# Patient Record
Sex: MALE | Race: WHITE
[De-identification: names, ages, dates, MRNs, and addresses within clinical notes are randomized per-mention and may not be internally consistent; named-entity substitution may affect disease eponyms.]

---

## 2019-02-13 ENCOUNTER — HOSPITAL ENCOUNTER (OUTPATIENT)
Dept: HOSPITAL 33 - SDC-PAIN | Age: 79
Discharge: HOME | End: 2019-02-13
Attending: PSYCHIATRY & NEUROLOGY
Payer: MEDICARE

## 2019-02-13 DIAGNOSIS — E11.9: ICD-10-CM

## 2019-02-13 DIAGNOSIS — Z79.899: ICD-10-CM

## 2019-02-13 DIAGNOSIS — M54.5: ICD-10-CM

## 2019-02-13 DIAGNOSIS — M47.816: Primary | ICD-10-CM

## 2019-02-13 DIAGNOSIS — I10: ICD-10-CM

## 2019-02-13 PROCEDURE — 72020 X-RAY EXAM OF SPINE 1 VIEW: CPT

## 2019-02-13 PROCEDURE — 77002 NEEDLE LOCALIZATION BY XRAY: CPT

## 2019-02-13 NOTE — XRAY
Indication: L3-S1 MBB.



Intraoperative fluoroscopy was provided for 15 seconds.  2 digital spot images

submitted for interpretation demonstrates posterior spinal needle tips

projecting over the expected course of the left and right L3-S1 nerve roots.

Correlate with intraoperative findings/report.

## 2019-03-13 ENCOUNTER — HOSPITAL ENCOUNTER (OUTPATIENT)
Dept: HOSPITAL 33 - SDC-PAIN | Age: 79
Discharge: HOME | End: 2019-03-13
Attending: PSYCHIATRY & NEUROLOGY
Payer: MEDICARE

## 2019-03-13 DIAGNOSIS — M19.90: ICD-10-CM

## 2019-03-13 DIAGNOSIS — M47.817: Primary | ICD-10-CM

## 2019-03-13 DIAGNOSIS — E11.9: ICD-10-CM

## 2019-03-13 DIAGNOSIS — I25.10: ICD-10-CM

## 2019-03-13 DIAGNOSIS — I10: ICD-10-CM

## 2019-03-13 PROCEDURE — 77002 NEEDLE LOCALIZATION BY XRAY: CPT

## 2019-03-13 PROCEDURE — 72100 X-RAY EXAM L-S SPINE 2/3 VWS: CPT

## 2019-03-13 PROCEDURE — 83721 ASSAY OF BLOOD LIPOPROTEIN: CPT

## 2019-03-13 PROCEDURE — 99100 ANES PT EXTEME AGE<1 YR&>70: CPT

## 2019-03-13 PROCEDURE — 80048 BASIC METABOLIC PNL TOTAL CA: CPT

## 2019-03-13 PROCEDURE — 36415 COLL VENOUS BLD VENIPUNCTURE: CPT

## 2019-03-13 PROCEDURE — 80061 LIPID PANEL: CPT

## 2019-03-13 NOTE — XRAY
Indication: Bilateral L3-S1 MBB.



Intraoperative fluoroscopy was provided for 15 seconds.  Single digital spot

image submitted for interpretation demonstrates posterior spinal needle tips

projecting over the expected course of the left and right L3-S1 nerve roots.

Correlate with intraoperative findings/report.

## 2019-04-03 ENCOUNTER — HOSPITAL ENCOUNTER (OUTPATIENT)
Dept: HOSPITAL 33 - SDC-PAIN | Age: 79
Discharge: HOME | End: 2019-04-03
Attending: PSYCHIATRY & NEUROLOGY
Payer: MEDICARE

## 2019-04-03 DIAGNOSIS — I25.10: ICD-10-CM

## 2019-04-03 DIAGNOSIS — I10: ICD-10-CM

## 2019-04-03 DIAGNOSIS — E11.9: ICD-10-CM

## 2019-04-03 DIAGNOSIS — M19.90: ICD-10-CM

## 2019-04-03 DIAGNOSIS — M47.817: Primary | ICD-10-CM

## 2019-04-03 DIAGNOSIS — Z79.899: ICD-10-CM

## 2019-04-03 PROCEDURE — 64633 DESTROY CERV/THOR FACET JNT: CPT

## 2019-04-03 PROCEDURE — 99100 ANES PT EXTEME AGE<1 YR&>70: CPT

## 2019-04-03 PROCEDURE — 77002 NEEDLE LOCALIZATION BY XRAY: CPT

## 2019-04-03 PROCEDURE — 64636 DESTROY L/S FACET JNT ADDL: CPT

## 2019-04-03 PROCEDURE — 64635 DESTROY LUMB/SAC FACET JNT: CPT

## 2019-04-03 PROCEDURE — 82962 GLUCOSE BLOOD TEST: CPT

## 2019-04-03 PROCEDURE — 72020 X-RAY EXAM OF SPINE 1 VIEW: CPT

## 2019-04-03 NOTE — XRAY
Indication: Left L3-S1 RFA.



Intraoperative fluoroscopy was provided for 59 seconds.  3 digital spot images

submitted for interpretation demonstrates posterior needle tips along the

expected course of the left L3-S1 nerve roots.  Correlate with intraoperative

findings/report.

## 2019-04-17 ENCOUNTER — HOSPITAL ENCOUNTER (OUTPATIENT)
Dept: HOSPITAL 33 - SDC-PAIN | Age: 79
Discharge: HOME | End: 2019-04-17
Attending: PSYCHIATRY & NEUROLOGY
Payer: MEDICARE

## 2019-04-17 DIAGNOSIS — M47.816: Primary | ICD-10-CM

## 2019-04-17 DIAGNOSIS — I25.10: ICD-10-CM

## 2019-04-17 DIAGNOSIS — E11.9: ICD-10-CM

## 2019-04-17 DIAGNOSIS — Z95.0: ICD-10-CM

## 2019-04-17 DIAGNOSIS — M19.90: ICD-10-CM

## 2019-04-17 DIAGNOSIS — Z86.73: ICD-10-CM

## 2019-04-17 DIAGNOSIS — I10: ICD-10-CM

## 2019-04-17 PROCEDURE — 99100 ANES PT EXTEME AGE<1 YR&>70: CPT

## 2019-04-17 PROCEDURE — 64636 DESTROY L/S FACET JNT ADDL: CPT

## 2019-04-17 PROCEDURE — 72020 X-RAY EXAM OF SPINE 1 VIEW: CPT

## 2019-04-17 PROCEDURE — 82962 GLUCOSE BLOOD TEST: CPT

## 2019-04-17 PROCEDURE — 77002 NEEDLE LOCALIZATION BY XRAY: CPT

## 2019-04-17 PROCEDURE — 64635 DESTROY LUMB/SAC FACET JNT: CPT

## 2019-04-17 NOTE — XRAY
Indication: Right L3-S1 RFA.



Intraoperative fluoroscopy was provided for 36 seconds.  4 digital spot images

submitted for interpretation demonstrates posterior needle tips along the

expected course of the right L3-S1 nerve roots.  Correlate with intraoperative

findings/report.

## 2019-06-20 ENCOUNTER — HOSPITAL ENCOUNTER (OUTPATIENT)
Dept: HOSPITAL 33 - SDC | Age: 79
Discharge: HOME | End: 2019-06-20
Attending: SURGERY
Payer: MEDICARE

## 2019-06-20 VITALS — SYSTOLIC BLOOD PRESSURE: 134 MMHG | HEART RATE: 70 BPM | DIASTOLIC BLOOD PRESSURE: 65 MMHG

## 2019-06-20 VITALS — OXYGEN SATURATION: 95 %

## 2019-06-20 DIAGNOSIS — D12.0: ICD-10-CM

## 2019-06-20 DIAGNOSIS — E11.9: ICD-10-CM

## 2019-06-20 DIAGNOSIS — K64.8: ICD-10-CM

## 2019-06-20 DIAGNOSIS — K62.5: Primary | ICD-10-CM

## 2019-06-20 PROCEDURE — 82962 GLUCOSE BLOOD TEST: CPT

## 2019-06-20 PROCEDURE — 99100 ANES PT EXTEME AGE<1 YR&>70: CPT

## 2019-06-20 NOTE — HP
DATE OF SURGERY:  06/20/2019



ANTICIPATED PROCEDURE:  Colonoscopy.



HISTORY OF PRESENT ILLNESS:  The patient had rectal bleeding and presents for endoscopic 
exam referred by Dr. Cailin Lea.

 

PAST MEDICAL HISTORY: 

ALLERGIES:  NONE.

MEDICATIONS:  See list. 



PAST SURGICAL HISTORY:  Shoulder surgery. Bypass. 



SOCIAL HISTORY: Negative.  

FAMILY HISTORY: Negative.



REVIEW OF SYSTEMS: Negative.



PHYSICAL EXAMINATION:  

VITAL SIGNS: Normal.

CHEST:  Clear.

COR: Regular.

ABDOMEN:  Satisfactory.  



IMPRESSION:  Rectal bleeding. 



PLAN:  Colonoscopy.

## 2019-06-20 NOTE — OP
SURGERY DATE/TIME:      06/20/2019  0848



PREOPERATIVE DIAGNOSIS:     Rectal bleeding.



POSTOPERATIVE DIAGNOSIS:   Moderate internal hemorrhoids, one cecal polyp. 



PROCEDURE:  Colonoscopy complete to cecum. There was a substantial amount of angulation 
and redundancy of the hepatic flexure but the cecum was obtained. Prep was marginal.



SURGEON:        Kam Sharma M.D.



ASSISTANT:         Yo Louie M.D.



ANESTHESIA:     MAC. 



COMPLICATIONS:    None.



CONDITION:        Stable.



FOLLOW UP:  Three years.



INDICATION:  The patient presents with rectal bleeding. 



DESCRIPTION OF PROCEDURE:  Taken to endoscopy. Left lateral decubitus position. He is a 
robust gentleman. Anal tone was satisfactory. Scope introduced. There were moderate 
internal hemorrhoids. The scope advanced to the hepatic flexure and it took some care, 
patience and abdominal pressure to advance this over into the cecum which was about 
another 14 inches down. Base of cecum was identified. There was a 1 cm cecal polyp that 
was taken with cold biopsy forceps to extinction. On circumferential withdraw, no 
additional mucosal lesions although prep was marginal. There were moderate internal 
hemorrhoids. The patient tolerated the procedure satisfactorily. Recommended follow up in 
three years.

## 2020-03-27 ENCOUNTER — HOSPITAL ENCOUNTER (OUTPATIENT)
Dept: HOSPITAL 33 - ED | Age: 80
Setting detail: OBSERVATION
LOS: 1 days | Discharge: HOME | End: 2020-03-28
Attending: GENERAL PRACTICE | Admitting: GENERAL PRACTICE
Payer: MEDICARE

## 2020-03-27 VITALS — HEART RATE: 60 BPM

## 2020-03-27 DIAGNOSIS — Z95.1: ICD-10-CM

## 2020-03-27 DIAGNOSIS — Z91.81: ICD-10-CM

## 2020-03-27 DIAGNOSIS — Z95.0: ICD-10-CM

## 2020-03-27 DIAGNOSIS — Z95.2: ICD-10-CM

## 2020-03-27 DIAGNOSIS — I73.9: ICD-10-CM

## 2020-03-27 DIAGNOSIS — E78.00: ICD-10-CM

## 2020-03-27 DIAGNOSIS — R42: ICD-10-CM

## 2020-03-27 DIAGNOSIS — E11.9: ICD-10-CM

## 2020-03-27 DIAGNOSIS — R53.1: Primary | ICD-10-CM

## 2020-03-27 DIAGNOSIS — Z79.899: ICD-10-CM

## 2020-03-27 DIAGNOSIS — S81.002A: ICD-10-CM

## 2020-03-27 DIAGNOSIS — I95.1: ICD-10-CM

## 2020-03-27 DIAGNOSIS — Z79.01: ICD-10-CM

## 2020-03-27 DIAGNOSIS — R07.9: ICD-10-CM

## 2020-03-27 DIAGNOSIS — I10: ICD-10-CM

## 2020-03-27 LAB
ALBUMIN SERPL-MCNC: 3.7 G/DL (ref 3.5–5)
ALP SERPL-CCNC: 66 U/L (ref 38–126)
ALT SERPL-CCNC: 12 U/L (ref 0–50)
ANION GAP SERPL CALC-SCNC: 10.2 MEQ/L (ref 5–15)
ANION GAP SERPL CALC-SCNC: 11.2 MEQ/L (ref 5–15)
AST SERPL QL: 24 U/L (ref 17–59)
BASOPHILS # BLD AUTO: 0.05 10*3/UL (ref 0–0.4)
BASOPHILS NFR BLD AUTO: 0.6 % (ref 0–0.4)
BILIRUB BLD-MCNC: 0.4 MG/DL (ref 0.2–1.3)
BUN SERPL-MCNC: 34 MG/DL (ref 9–20)
BUN SERPL-MCNC: 36 MG/DL (ref 9–20)
CALCIUM SPEC-MCNC: 8.6 MG/DL (ref 8.4–10.2)
CALCIUM SPEC-MCNC: 8.9 MG/DL (ref 8.4–10.2)
CHLORIDE SERPL-SCNC: 107 MMOL/L (ref 98–107)
CHLORIDE SERPL-SCNC: 108 MMOL/L (ref 98–107)
CO2 SERPL-SCNC: 26 MMOL/L (ref 22–30)
CO2 SERPL-SCNC: 27 MMOL/L (ref 22–30)
CREAT SERPL-MCNC: 1.21 MG/DL (ref 0.66–1.25)
CREAT SERPL-MCNC: 1.3 MG/DL (ref 0.66–1.25)
EOSINOPHIL # BLD AUTO: 0.06 10*3/UL (ref 0–0.5)
GLUCOSE SERPL-MCNC: 69 MG/DL (ref 74–106)
GLUCOSE SERPL-MCNC: 93 MG/DL (ref 74–106)
GLUCOSE UR-MCNC: NEGATIVE MG/DL
HCT VFR BLD AUTO: 34.9 % (ref 42–50)
HGB BLD-MCNC: 11.4 GM/DL (ref 12.5–18)
LYMPHOCYTES # SPEC AUTO: 1.65 10*3/UL (ref 1–4.6)
MCH RBC QN AUTO: 30.2 PG (ref 26–32)
MCHC RBC AUTO-ENTMCNC: 32.7 G/DL (ref 32–36)
MONOCYTES # BLD AUTO: 0.66 10*3/UL (ref 0–1.3)
PLATELET # BLD AUTO: 218 K/MM3 (ref 150–450)
POTASSIUM SERPLBLD-SCNC: 3.8 MMOL/L (ref 3.5–5.1)
POTASSIUM SERPLBLD-SCNC: 4.1 MMOL/L (ref 3.5–5.1)
PROT SERPL-MCNC: 7.2 G/DL (ref 6.3–8.2)
PROT UR STRIP-MCNC: NEGATIVE MG/DL
RBC # BLD AUTO: 3.77 M/MM3 (ref 4.1–5.6)
RBC #/AREA URNS HPF: (no result) /HPF (ref 0–2)
SODIUM SERPL-SCNC: 140 MMOL/L (ref 137–145)
SODIUM SERPL-SCNC: 141 MMOL/L (ref 137–145)
WBC # BLD AUTO: 8.4 K/MM3 (ref 4–10.5)
WBC #/AREA URNS HPF: (no result) /HPF (ref 0–5)

## 2020-03-27 PROCEDURE — 87040 BLOOD CULTURE FOR BACTERIA: CPT

## 2020-03-27 PROCEDURE — 84484 ASSAY OF TROPONIN QUANT: CPT

## 2020-03-27 PROCEDURE — 83605 ASSAY OF LACTIC ACID: CPT

## 2020-03-27 PROCEDURE — 93005 ELECTROCARDIOGRAM TRACING: CPT

## 2020-03-27 PROCEDURE — 85025 COMPLETE CBC W/AUTO DIFF WBC: CPT

## 2020-03-27 PROCEDURE — 82962 GLUCOSE BLOOD TEST: CPT

## 2020-03-27 PROCEDURE — G0378 HOSPITAL OBSERVATION PER HR: HCPCS

## 2020-03-27 PROCEDURE — 71045 X-RAY EXAM CHEST 1 VIEW: CPT

## 2020-03-27 PROCEDURE — 96360 HYDRATION IV INFUSION INIT: CPT

## 2020-03-27 PROCEDURE — 93268 ECG RECORD/REVIEW: CPT

## 2020-03-27 PROCEDURE — 36415 COLL VENOUS BLD VENIPUNCTURE: CPT

## 2020-03-27 PROCEDURE — 80048 BASIC METABOLIC PNL TOTAL CA: CPT

## 2020-03-27 PROCEDURE — 72220 X-RAY EXAM SACRUM TAILBONE: CPT

## 2020-03-27 PROCEDURE — 81001 URINALYSIS AUTO W/SCOPE: CPT

## 2020-03-27 PROCEDURE — 70450 CT HEAD/BRAIN W/O DYE: CPT

## 2020-03-27 PROCEDURE — 83880 ASSAY OF NATRIURETIC PEPTIDE: CPT

## 2020-03-27 PROCEDURE — 93041 RHYTHM ECG TRACING: CPT

## 2020-03-27 PROCEDURE — 96361 HYDRATE IV INFUSION ADD-ON: CPT

## 2020-03-27 PROCEDURE — 80053 COMPREHEN METABOLIC PANEL: CPT

## 2020-03-27 PROCEDURE — 72110 X-RAY EXAM L-2 SPINE 4/>VWS: CPT

## 2020-03-27 PROCEDURE — 99285 EMERGENCY DEPT VISIT HI MDM: CPT

## 2020-03-27 PROCEDURE — 36000 PLACE NEEDLE IN VEIN: CPT

## 2020-03-27 RX ADMIN — ACETAMINOPHEN PRN MG: 325 TABLET ORAL at 12:40

## 2020-03-27 RX ADMIN — SACUBITRIL AND VALSARTAN SCH TABLET: 49; 51 TABLET, FILM COATED ORAL at 20:53

## 2020-03-27 RX ADMIN — ACETAMINOPHEN PRN MG: 325 TABLET ORAL at 20:53

## 2020-03-27 NOTE — XRAY
Indication: Pain and dizziness.  Status post fall.



Comparison: June 5, 2019.



Portable chest remains clear again with a few incidental calcified granulomas.

 Heart is borderline enlarged again with CABG surgery and left pacemaker.

Bony thorax intact.  No new/acute findings.



Impression: Continued nonacute chest with chronic features.

## 2020-03-27 NOTE — ERPHSYRPT
- History of Present Illness


Time Seen by Provider: 03/27/20 07:33


Source: patient


Exam Limitations: no limitations


Patient Subjective Stated Complaint: lower back and L hip pain d/t recent falls


Triage Nursing Assessment: pt to ED c/o recent falls x few days and now having 

lower back and L hip pain. unable to give pain score at this time, "it just 

hurts." pt back to room via WC. transfers to bed with 1 assist. pt states 

multiple episodes dizziness and falls over last few days. pt states he is on 

plavix and zorelto. denies hitting head, no LOC. pt A&Ox3 at this time. 

communicates appropriately. lung sounds clear and equal bilaterally. heart 

sounds clear. bowel sounds present in all quads. pt has pacemaker and 

artificial heart valve.


Physician History: 


79 years old male with history of coronary artery disease status post CABG, 

pacemaker placement, CVA, diabetes mellitus, hypertension, hyperlipidemia 

presented in the ER with chief complaint of frequent fall and generalized 

weakness for the last 3 to 4 days.  Patient reports having multiple falls 

whenever he tries to get up and walk and hit his head couple of times.  No loss 

of consciousness.  Patient reports he feels dizzy and lightheaded but denies 

any chest pain palpitations or shortness of breath.  He denies any focal 

weakness but all over.  Denies any recent nausea vomiting or diarrhea.  No 

abdominal pain.  No recent fever or chills reported.  He is complaining of pain 

in the tailbone area mild to moderate especially with ambulation and palpation.





Timing/Duration: day(s) (4), intermittent, worse


Severity: moderate


Modifying Factors: Improves With: movement


Associated Symptoms: weakness (generalized)


Allergies/Adverse Reactions: 








No Known Drug Allergies Allergy (Verified 03/27/20 07:34)


 





Home Medications: 








Clopidogrel Bisulfate 75 mg*** [PLAVIX 75 MG Tablet***] 75 mg PO DAILY 06/12/19 

[History]


Docusate Sodium 100 mg*** [Colace 100 MG***] 100 mg PO DAILY 06/12/19 [History]


Furosemide 40 mg*** [Lasix 40 MG***] 40 mg PO DAILY 06/12/19 [History]


Isosorbide Mononitrate 30 mg** [Imdur 30 MG***] 30 mg PO DAILY 06/12/19 [History

]


Magnesium Oxide 400 mg*** [Mag-Ox 400***] 400 mg PO DAILY 06/12/19 [History]


Metoprolol Succinate 50 mg*** [Toprol Xl 50 MG***] 50 mg PO DAILY 06/12/19 [

History]


Pravastatin Sodium [Pravachol] 40 mg PO DAILY 06/12/19 [History]


Rivaroxaban [Xarelto] 15 mg PO DAILY 06/12/19 [History]


Sacubitril/Valsartan [Entresto 49 mg-51 mg Tablet] 1 each PO BID 06/12/19 [

History]


Insulin Glargine,Hum.rec.anlog [Lantus] 100 unit SQ DAILY 06/20/19 [History]


Insulin Lispro [Humalog] 100 unit SQ DAILY PRN 06/20/19 [History]





Hx Tetanus, Diphtheria Vaccination/Date Given: No


Hx Influenza Vaccination/Date Given: Yes


Hx Pneumococcal Vaccination/Date Given: Yes


Immunizations Up to Date: No





Travel Risk





- International Travel


Have you traveled outside of the country in past 3 weeks: No


Have you or anyone close to you been diagnosed with or: No


Do your reside in a community with a known COVID-19 case?: Yes


If Yes where:: [place]





- Coronavirus Screening


Has patient experienced Coronavirus symptoms: No





- Review of Systems


Constitutional: Fatigue


Eyes: No Symptoms


Ears, Nose, & Throat: No Symptoms


Respiratory: No Symptoms


Cardiac: No Symptoms


Abdominal/Gastrointestinal: No Symptoms


Genitourinary Symptoms: No Symptoms


Musculoskeletal: Fall, Injury


Skin: No Symptoms


Neurological: No Symptoms


Psychological: No Symptoms


Endocrine: No Symptoms


Hematologic/Lymphatic: No Symptoms


Immunological/Allergic: No Symptoms





- Past Medical History


Pertinent Past Medical History: Yes


Neurological History: Stroke


ENT History: Cataracts


Cardiac History: Congestive Heart Failure, Coronary Artery Disease, High 

Cholesterol, Hypertension


Respiratory History: Sleep Apnea


Endocrine Medical History: Diabetes Type II


Musculoskeletal History: Arthritis


GI Medical History: No Pertinent History


 History: No Pertinent History


Psycho-Social History: No Pertinent History


Male Reproductive Disorders: No Pertinent History





- Past Surgical History


Past Surgical History: Yes


Neuro Surgical History: No Pertinent History


Cardiac: CABG, Pacemaker


Respiratory: No Pertinent History


Gastrointestinal: No Pertinent History


Genitourinary: No Pertinent History


Musculoskeletal: Other


Male Surgical History: No Pertinent History


Other Surgical History: right knee,  right shoulder surgery.cancer removed on 

lip and colonscopy in past





- Social History


Smoking Status: Former smoker


Exposure to second hand smoke: No


Drug Use: none





- Nursing Vital Signs


Nursing Vital Signs: 


 Initial Vital Signs











Temperature  97.8 F   03/27/20 07:17


 


Pulse Rate  60   03/27/20 07:17


 


Respiratory Rate  16   03/27/20 07:17


 


Blood Pressure  141/58   03/27/20 07:17


 


O2 Sat by Pulse Oximetry  96   03/27/20 07:17














- Physical Exam


General Appearance: no apparent distress, alert


Eye Exam: PERRL/EOMI, eyes nml inspection


Ears, Nose, Throat Exam: normal ENT inspection, TMs normal, pharynx normal


Neck Exam: normal inspection, non-tender, supple, full range of motion


Respiratory Exam: normal breath sounds, lungs clear, airway intact, No 

respiratory distress


Cardiovascular Exam: regular rate/rhythm, normal heart sounds, normal 

peripheral pulses


Gastrointestinal/Abdomen Exam: soft, normal bowel sounds, No tenderness, No 

distention


Rectal Exam: deferred


Back Exam: decreased range of motion, point tenderness (Tailbone area), other, 

No CVA tenderness, No vertebral tenderness


Extremity Exam: normal inspection, normal range of motion, pelvis stable


Neurologic Exam: alert, oriented x 3, cooperative, CNs II-XII nml as tested, 

normal mood/affect


Skin Exam: normal color


**SpO2 Interpretation**: normal


SpO2: 96


O2 Delivery: Room Air





- Course


Nursing assessment & vital signs reviewed: Yes


EKG Interpreted by Me: RATE (60), Right Axis Deviation (Rhythm.  Wide QRS 

complexes.  No acute ST elevation or depression)


Ordered Tests: 


 Active Orders 24 hr











 Category Date Time Status


 


 Cardiac Monitor STAT Care  03/27/20 07:50 Active


 


 EKG-ER Only STAT Care  03/27/20 07:48 Active


 


 IV Insertion STAT Care  03/27/20 07:48 Active


 


 Orthostatic Vital Signs STAT Care  03/27/20 07:48 Active


 


 2000 Calorie ADA Diet  03/27/20 Breakfast Active


 


 CHEST 1 VIEW (PORTABLE) Stat Exams  03/27/20 07:49 Completed


 


 HEAD WITHOUT CONTRAST [CT] Stat Exams  03/27/20 07:49 Completed


 


 LUMBAR COMPLETE (MIN 4 VIEWS) Stat Exams  03/27/20 08:13 Completed


 


 SACRUM AND COCCYX Stat Exams  03/27/20 08:15 Completed


 


 BNP [NT PRO BNP] Stat Lab  03/27/20 07:40 Completed


 


 CBC W DIFF Stat Lab  03/27/20 07:40 Completed


 


 CMP Stat Lab  03/27/20 07:40 Completed


 


 Lactic Acid Stat Lab  03/27/20 08:36 Completed


 


 TROPONIN Q3H Lab  03/27/20 07:40 Completed


 


 TROPONIN Q3H Lab  03/27/20 11:00 Ordered


 


 TROPONIN Q3H Lab  03/27/20 14:00 Ordered


 


 TROPONIN Q3H Lab  03/27/20 17:00 Ordered


 


 TROPONIN Q3H Lab  03/27/20 20:00 Ordered


 


 UA W/RFX UR CULTURE Stat Lab  03/27/20 07:49 Completed








Medication Summary














Discontinued Medications














Generic Name Dose Route Start Last Admin





  Trade Name Freq  PRN Reason Stop Dose Admin


 


Sodium Chloride  1,000 mls @ 499 mls/hr  03/27/20 07:52  03/27/20 10:56





  Sodium Chloride 0.9% 1000 Ml  IV  03/27/20 09:52  Infused





  .Q2H1M STA   Infusion





     





     





     





     


 


Sodium Chloride  Confirm  03/27/20 07:57  





  Sodium Chloride 0.9% 1000 Ml  Administered  03/27/20 07:58  





  Dose   





  1,000 mls @ ud   





  .ROUTE   





  .STK-MED ONE   





     





     





     





     











Lab/Rad Data: 


 Laboratory Result Diagrams





 03/27/20 07:40 





 03/27/20 07:40 





 Laboratory Results











  03/27/20 03/27/20 03/27/20 Range/Units





  08:36 07:49 07:40 


 


WBC     (4.0-10.5)  K/mm3


 


RBC     (4.1-5.6)  M/mm3


 


Hgb     (12.5-18.0)  gm/dl


 


Hct     (42-50)  %


 


MCV     ()  fl


 


MCH     (26-32)  pg


 


MCHC     (32-36)  g/dl


 


RDW     (11.5-14.0)  %


 


Plt Count     (150-450)  K/mm3


 


MPV     (7.5-11.0)  fl


 


Gran %     (36.0-66.0)  %


 


Eos # (Auto)     (0-0.5)  


 


Absolute Lymphs (auto)     (1.0-4.6)  


 


Absolute Monos (auto)     (0.0-1.3)  


 


Lymphocytes %     (24.0-44.0)  %


 


Monocytes %     (0.0-12.0)  %


 


Eosinophils %     (0.00-5.0)  %


 


Basophils %     (0.0-0.4)  %


 


Absolute Granulocytes     (1.4-6.9)  


 


Basophils #     (0-0.4)  


 


Sodium     (137-145)  mmol/L


 


Potassium     (3.5-5.1)  mmol/L


 


Chloride     ()  mmol/L


 


Carbon Dioxide     (22-30)  mmol/L


 


Anion Gap     (5-15)  MEQ/L


 


BUN     (9-20)  mg/dL


 


Creatinine     (0.66-1.25)  mg/dL


 


Estimated GFR     ML/MIN


 


Glucose     ()  mg/dL


 


Lactic Acid  1.1    (0.4-2.0)  


 


Calcium     (8.4-10.2)  mg/dL


 


Total Bilirubin     (0.2-1.3)  mg/dL


 


AST     (17-59)  U/L


 


ALT     (0-50)  U/L


 


Alkaline Phosphatase     ()  U/L


 


Troponin I    < 0.012  (0.000-0.034)  ng/mL


 


NT-Pro-B Natriuret Pep     (0-1800)  pg/mL


 


Serum Total Protein     (6.3-8.2)  g/dL


 


Albumin     (3.5-5.0)  g/dL


 


Urine Color   YELLOW   (YELLOW)  


 


Urine Appearance   CLEAR   (CLEAR)  


 


Urine pH   5.0   (5-6)  


 


Ur Specific Gravity   1.014   (1.005-1.025)  


 


Urine Protein   NEGATIVE   (Negative)  


 


Urine Ketones   NEGATIVE   (NEGATIVE)  


 


Urine Blood   NEGATIVE   (0-5)  Ha/ul


 


Urine Nitrite   NEGATIVE   (NEGATIVE)  


 


Urine Bilirubin   NEGATIVE   (NEGATIVE)  


 


Urine Urobilinogen   NEGATIVE   (0-1)  mg/dL


 


Ur Leukocyte Esterase   NEGATIVE   (NEGATIVE)  


 


Urine WBC (Auto)   0-2   (0-5)  /HPF


 


Urine RBC (Auto)   NONE   (0-2)  /HPF


 


U Epithel Cells (Auto)   NONE   (FEW)  /HPF


 


Urine Bacteria (Auto)   NONE   (NEGATIVE)  /HPF


 


Other Casts (Auto)   NEGATIVE   (NEGATIVE)  /LPF


 


Urine Mucus (Auto)   SLIGHT   (NEGATIVE)  /HPF


 


Urine Culture Reflexed   NO   (NO)  


 


Urine Glucose   NEGATIVE   (NEGATIVE)  mg/dL














  03/27/20 03/27/20 03/27/20 Range/Units





  07:40 07:40 07:40 


 


WBC    8.4  (4.0-10.5)  K/mm3


 


RBC    3.77 L  (4.1-5.6)  M/mm3


 


Hgb    11.4 L  (12.5-18.0)  gm/dl


 


Hct    34.9 L  (42-50)  %


 


MCV    92.6  ()  fl


 


MCH    30.2  (26-32)  pg


 


MCHC    32.7  (32-36)  g/dl


 


RDW    13.9  (11.5-14.0)  %


 


Plt Count    218  (150-450)  K/mm3


 


MPV    10.2  (7.5-11.0)  fl


 


Gran %    71.1 H  (36.0-66.0)  %


 


Eos # (Auto)    0.06  (0-0.5)  


 


Absolute Lymphs (auto)    1.65  (1.0-4.6)  


 


Absolute Monos (auto)    0.66  (0.0-1.3)  


 


Lymphocytes %    19.7 L  (24.0-44.0)  %


 


Monocytes %    7.9  (0.0-12.0)  %


 


Eosinophils %    0.7  (0.00-5.0)  %


 


Basophils %    0.6  (0.0-0.4)  %


 


Absolute Granulocytes    5.97  (1.4-6.9)  


 


Basophils #    0.05  (0-0.4)  


 


Sodium   141   (137-145)  mmol/L


 


Potassium   4.1   (3.5-5.1)  mmol/L


 


Chloride   108 H   ()  mmol/L


 


Carbon Dioxide   26   (22-30)  mmol/L


 


Anion Gap   11.2   (5-15)  MEQ/L


 


BUN   36 H   (9-20)  mg/dL


 


Creatinine   1.21   (0.66-1.25)  mg/dL


 


Estimated GFR   > 60.0   ML/MIN


 


Glucose   69 L   ()  mg/dL


 


Lactic Acid     (0.4-2.0)  


 


Calcium   8.9   (8.4-10.2)  mg/dL


 


Total Bilirubin   0.40   (0.2-1.3)  mg/dL


 


AST   24   (17-59)  U/L


 


ALT   12   (0-50)  U/L


 


Alkaline Phosphatase   66   ()  U/L


 


Troponin I     (0.000-0.034)  ng/mL


 


NT-Pro-B Natriuret Pep  1250    (0-1800)  pg/mL


 


Serum Total Protein   7.2   (6.3-8.2)  g/dL


 


Albumin   3.7   (3.5-5.0)  g/dL


 


Urine Color     (YELLOW)  


 


Urine Appearance     (CLEAR)  


 


Urine pH     (5-6)  


 


Ur Specific Gravity     (1.005-1.025)  


 


Urine Protein     (Negative)  


 


Urine Ketones     (NEGATIVE)  


 


Urine Blood     (0-5)  Ha/ul


 


Urine Nitrite     (NEGATIVE)  


 


Urine Bilirubin     (NEGATIVE)  


 


Urine Urobilinogen     (0-1)  mg/dL


 


Ur Leukocyte Esterase     (NEGATIVE)  


 


Urine WBC (Auto)     (0-5)  /HPF


 


Urine RBC (Auto)     (0-2)  /HPF


 


U Epithel Cells (Auto)     (FEW)  /HPF


 


Urine Bacteria (Auto)     (NEGATIVE)  /HPF


 


Other Casts (Auto)     (NEGATIVE)  /LPF


 


Urine Mucus (Auto)     (NEGATIVE)  /HPF


 


Urine Culture Reflexed     (NO)  


 


Urine Glucose     (NEGATIVE)  mg/dL














- Progress


Progress: re-examined


Progress Note: 


72 years old is evaluated for generalized weakness and frequent falls.  He has 

positive orthostatics.  Given IV fluids.  Broad work-up was done including CT 

head which is negative for any acute findings.  EKG is paced rhythm, negative 

initial troponin.  Normal lactate.  No acute electrolyte abnormality.  No UTI.  

I do not know the exact cause of his weakness, cultures will be obtained, 

discussed with Dr. Villafuerte, patient would be admitted to telemetry with PT 

consult.


03/27/20 11:04





Discussed with : Fortino


Will see patient in: hospital (observation)


Counseled pt/family regarding: lab results, diagnosis, rad results





- Departure


Departure Disposition: Home


Clinical Impression: 


 General weakness, Frequent falls





Condition: Fair


Critical Care Time: No


Referrals: 


RICHARD COWAN [Primary Care Provider] -

## 2020-03-27 NOTE — XRAY
Indication: Pain following fall.



Comparison: None



3 views of the sacrum/coccyx again demonstrates lower lumbar degenerative

changes reported separately and scattered vascular calcifications with new

left femoral artery stent graft.  No acute bony, articular, or soft tissue

abnormalities.

## 2020-03-27 NOTE — XRAY
Indication: Dizziness.  Frequent falls.



Multiple contiguous axial images obtained through the head without contrast.



Comparison: None



Age-appropriate global atrophy, mild periventricular degenerative

micro-ischemia bilaterally, and remote right basal ganglia lacunar infarct.

No acute intracranial hemorrhage, abnormal extra-axial fluid collection, or

mass effect.  Fourth ventricle is midline without hydrocephalus.  Bony

calvarium intact.  Visualized paranasal sinuses and mastoid air cells are

clear.



Impression: Nonacute senile brain with incidental remote right basal ganglia

lacunar infarct.

## 2020-03-27 NOTE — XRAY
Indication: Pain following fall.



Comparison: June 19, 2018.



5 views of the lumbar spine unchanged again demonstrating normal alignment

with vertebral body heights/disc spaces maintained, mild/moderate multilevel

degenerative spondylosis, and scattered aortoiliac calcifications.  No

new/acute findings.

## 2020-03-28 VITALS — SYSTOLIC BLOOD PRESSURE: 134 MMHG | DIASTOLIC BLOOD PRESSURE: 61 MMHG | OXYGEN SATURATION: 96 %

## 2020-03-28 LAB
ALBUMIN SERPL-MCNC: 3.8 G/DL (ref 3.5–5)
ALP SERPL-CCNC: 74 U/L (ref 38–126)
ALT SERPL-CCNC: 13 U/L (ref 0–50)
ANION GAP SERPL CALC-SCNC: 13.1 MEQ/L (ref 5–15)
AST SERPL QL: 26 U/L (ref 17–59)
BASOPHILS # BLD AUTO: 0.05 10*3/UL (ref 0–0.4)
BASOPHILS NFR BLD AUTO: 0.8 % (ref 0–0.4)
BILIRUB BLD-MCNC: 0.6 MG/DL (ref 0.2–1.3)
BUN SERPL-MCNC: 28 MG/DL (ref 9–20)
CALCIUM SPEC-MCNC: 8.6 MG/DL (ref 8.4–10.2)
CHLORIDE SERPL-SCNC: 107 MMOL/L (ref 98–107)
CO2 SERPL-SCNC: 23 MMOL/L (ref 22–30)
CREAT SERPL-MCNC: 1.06 MG/DL (ref 0.66–1.25)
EOSINOPHIL # BLD AUTO: 0.09 10*3/UL (ref 0–0.5)
GLUCOSE SERPL-MCNC: 135 MG/DL (ref 74–106)
HCT VFR BLD AUTO: 36.3 % (ref 42–50)
HGB BLD-MCNC: 11.8 GM/DL (ref 12.5–18)
LYMPHOCYTES # SPEC AUTO: 1.71 10*3/UL (ref 1–4.6)
MCH RBC QN AUTO: 30.5 PG (ref 26–32)
MCHC RBC AUTO-ENTMCNC: 32.5 G/DL (ref 32–36)
MONOCYTES # BLD AUTO: 0.43 10*3/UL (ref 0–1.3)
PLATELET # BLD AUTO: 215 K/MM3 (ref 150–450)
POTASSIUM SERPLBLD-SCNC: 4.3 MMOL/L (ref 3.5–5.1)
PROT SERPL-MCNC: 7.6 G/DL (ref 6.3–8.2)
RBC # BLD AUTO: 3.87 M/MM3 (ref 4.1–5.6)
SODIUM SERPL-SCNC: 139 MMOL/L (ref 137–145)
WBC # BLD AUTO: 6.4 K/MM3 (ref 4–10.5)

## 2020-03-28 RX ADMIN — DULOXETINE HYDROCHLORIDE SCH MG: 30 CAPSULE, DELAYED RELEASE ORAL at 09:50

## 2020-03-28 RX ADMIN — ACETAMINOPHEN PRN MG: 325 TABLET ORAL at 05:16

## 2020-03-28 RX ADMIN — DULOXETINE HYDROCHLORIDE SCH: 30 CAPSULE, DELAYED RELEASE ORAL at 10:50

## 2020-03-28 RX ADMIN — SACUBITRIL AND VALSARTAN SCH TABLET: 49; 51 TABLET, FILM COATED ORAL at 09:50

## 2020-03-28 NOTE — PCM.SSS
History of Present Illness





- Chief Complaint


Chief Complaint: General weakness.


History of Present Illness: 


 is a 79 year old male pt of Dr. Cowan with PMHx CAD (hx CABG), PVD (hx 

stents), artifical heart valve (aorta), pacemaker (replaced 2 wks ago), DM, 

diabetic peripheral neuropathy, HTN, ANEL, and chronic back pain who was 

admitted through ER with frequent falls and weakness.  He denies sury 

dizziness.  About 2 weeks ago he started having some falls, which gradually 

increased until the day prior to admission he had falls x5.  He did have 

increased low back pain with the falls.  He is on xarelto and plavix.  





In ER, his CT head and xr lumbar spine were neg.  His labs were grossly neg.  

CXR nonacute.  Troponins have been nl x 5.  His labs this morning are again 

unremarkable.





Since admission, he has not fallen.  He has been up around his room and to the 

bathroom with no issues.  He was found to be orthostatic in ER.  His BP have 

been 130s-140s systolic.  He has been on telemetry.





In light of the current coronavirus pandemic (and a pending admission of 

possible COVID-19 this morning), I am discharging this very high risk patient 

to home.  I advised him to stay in; be careful of falling of course, but 

minimize contacts with others.  I am sending a message to Dr. Cowan who can 

contact the pt next week to see if she wants him to f/u with her or cardiology 

or both.





- Review of Systems


Constitutional: Weakness


Cardiac: Chest Pain (mild, did not have to take nitro)


Neurological: Other (falls)


All Other Systems: Reviewed and Negative





Medications & Allergies


Home Medications: 


 Home Medication List





Clopidogrel Bisulfate 75 mg*** [PLAVIX 75 MG Tablet***] 75 mg PO DAILY 06/12/19 

[History Confirmed 03/27/20]


Docusate Sodium 100 mg*** [Colace 100 MG***] 100 mg PO DAILY 06/12/19 [History 

Confirmed 03/27/20]


Furosemide 40 mg*** [Lasix 40 MG***] 40 mg PO DAILY 06/12/19 [History Confirmed 

03/27/20]


Magnesium Oxide 400 mg*** [Mag-Ox 400***] 400 mg PO DAILY 06/12/19 [History 

Confirmed 03/27/20]


Metoprolol Succinate 50 mg*** [Toprol Xl 50 MG***] 50 mg PO DAILY 06/12/19 [

History Confirmed 03/27/20]


Pravastatin Sodium [Pravachol] 40 mg PO DAILY 06/12/19 [History Confirmed 03/27/ 20]


Rivaroxaban [Xarelto] 15 mg PO DAILY 06/12/19 [History Confirmed 03/27/20]


Sacubitril/Valsartan [Entresto 49 mg-51 mg Tablet] 1 each PO BID 06/12/19 [

History Confirmed 03/27/20]


Insulin Glargine,Hum.rec.anlog [Lantus] 100 unit SQ LUNCH 06/20/19 [History 

Confirmed 03/27/20]


Insulin Lispro [Humalog] 100 unit SQ DAILY PRN 06/20/19 [History Confirmed 03/27 /20]








Allergies/Adverse Reactions: 


 Allergies











Allergy/AdvReac Type Severity Reaction Status Date / Time


 


No Known Drug Allergies Allergy   Verified 03/27/20 07:34














- Past Medical History


Past Medical History: Yes


Neurological History: Stroke


ENT History: Cataracts


Cardiac History: Congestive Heart Failure, Coronary Artery Disease, High 

Cholesterol, Hypertension


Respiratory History: Sleep Apnea


Endocrine Medical History: Diabetes Type II


Musculoskelatal History: Arthritis


GI Medical History: No Pertinent History


 History: No Pertinent History


Pyscho-Social History: No Pertinent History


Male Reproductive Disorders: No Pertinent History





- Past Surgical History


Past Surgical History: Yes


Neuro Surgical History: No Pertinent History


Cardiac History: CABG, Pacemaker


Respiratory Surgery: No Pertinent History


GI Surgical History: No Pertinent History


Genitourinary Surgical Hx: No Pertinent History


Musculskeletal Surgical Hx: Other


Male Surgical History: No Pertinent History


Other Surgical History: right knee,  right shoulder surgery.cancer removed on 

lip and colonscopy in past





- Social History


Smoking Status: Former smoker


Exposure to second hand smoke: No


Alcohol: None


Drug Use: none





- Physical Exam


Vital Signs: 


 Vital Signs - 24 hr











  Temp Pulse Resp BP Pulse Ox


 


 03/28/20 08:00  97.6 F  60  18  134/61  96


 


 03/28/20 04:00  97.9 F  60  18  144/65  97


 


 03/27/20 23:34  97.9 F  60  20  142/67  95


 


 03/27/20 19:32  97.7 F  60  18  109/51  96


 


 03/27/20 16:00  97.8 F  65  20  132/60  92 L


 


 03/27/20 13:00  97.8 F  64  20  147/66  97


 


 03/27/20 12:07  97.8 F  64  20  147/66  97











General Appearance: no apparent distress, obese


Neurologic Exam: alert, oriented x 3, cooperative


Eye Exam: eyes nml inspection


Ears, Nose, Throat Exam: moist mucous membranes


Neck Exam: normal inspection, supple


Respiratory Exam: normal breath sounds, lungs clear, No crackles/rales, No 

rhonchi, No wheezing


Cardiovascular Exam: regular rate/rhythm, normal heart sounds, No murmur


Gastrointestinal/Abdomen Exam: soft, normal bowel sounds, No tenderness, No 

distention, No mass, No guarding, No rebound


Extremity Exam: other (L knee anteriorly with irregular apptox 3x5cm eschar 

with no surrounding induration, very mild surrounding erythema), No pedal edema

, No swelling


Wound Assessment: 


 Skin/Wound Assessment





Wound/Incision Assessment                                  Start:  03/27/20 13:

15


Text:                                                      Status: Active      

  


Freq:   Q6H                                                                    

  


Protocol:                                                                      

  


 Document     03/28/20 08:00  AS  (Rec: 03/28/20 09:08  AS  [id number])


 Wound/Incision Assessment


     Right Toe


      Wound Assessment                           Admission


      Wound Type                                 Abrasion


      Wound Stage                                Non Pressure Wound


      Comment                                    R foot 2nd and 4th toe scabbed


                                                 without drainage noted.


     Knee


      Wound Assessment                           Admission


      Wound Type                                 Abrasion


      Wound Stage                                Non Pressure Wound


      Surrounding Tissue                         Bright Red


      Comment                                    large abrasion to L knee.


                                                 Small scabbed areas to R knee;


                                                 both closed without drainage.


 Wound Photo


     Photo Taken                                 Yes


     Date:                                       03/27/20


     Time:                                       12:10


     Distance from Wound:                        from bedside.











Results





- Labs


Lab/Micro Results: 


 Accuchecks











Date                           03/27/20


 


Date                           03/27/20


 


Date                           03/27/20


 


Time                           20:00


 


Time                           16:22


 


Time                           12:50


 


Accucheck Value:               158


 


Accucheck Value:               96


 


Accucheck Value:               115


 


Accucheck Value:               118


 


Accucheck Value:               91


 


Accucheck Value:               85











 Lab Results-Last 24 Hours











  03/27/20 03/27/20 03/27/20 Range/Units





  11:05 13:59 13:59 


 


WBC     (4.0-10.5)  K/mm3


 


RBC     (4.1-5.6)  M/mm3


 


Hgb     (12.5-18.0)  gm/dl


 


Hct     (42-50)  %


 


MCV     ()  fl


 


MCH     (26-32)  pg


 


MCHC     (32-36)  g/dl


 


RDW     (11.5-14.0)  %


 


Plt Count     (150-450)  K/mm3


 


MPV     (7.5-11.0)  fl


 


Gran %     (36.0-66.0)  %


 


Eos # (Auto)     (0-0.5)  


 


Absolute Lymphs (auto)     (1.0-4.6)  


 


Absolute Monos (auto)     (0.0-1.3)  


 


Lymphocytes %     (24.0-44.0)  %


 


Monocytes %     (0.0-12.0)  %


 


Eosinophils %     (0.00-5.0)  %


 


Basophils %     (0.0-0.4)  %


 


Absolute Granulocytes     (1.4-6.9)  


 


Basophils #     (0-0.4)  


 


Sodium    140  (137-145)  mmol/L


 


Potassium    3.8  (3.5-5.1)  mmol/L


 


Chloride    107  ()  mmol/L


 


Carbon Dioxide    27  (22-30)  mmol/L


 


Anion Gap    10.2  (5-15)  MEQ/L


 


BUN    34 H  (9-20)  mg/dL


 


Creatinine    1.30 H  (0.66-1.25)  mg/dL


 


Estimated GFR    56.9  ML/MIN


 


Glucose    93  ()  mg/dL


 


Calcium    8.6  (8.4-10.2)  mg/dL


 


Total Bilirubin     (0.2-1.3)  mg/dL


 


AST     (17-59)  U/L


 


ALT     (0-50)  U/L


 


Alkaline Phosphatase     ()  U/L


 


Troponin I  < 0.012  < 0.012   (0.000-0.034)  ng/mL


 


Serum Total Protein     (6.3-8.2)  g/dL


 


Albumin     (3.5-5.0)  g/dL














  03/27/20 03/27/20 03/28/20 Range/Units





  17:58 20:20 05:25 


 


WBC    6.4  (4.0-10.5)  K/mm3


 


RBC    3.87 L  (4.1-5.6)  M/mm3


 


Hgb    11.8 L  (12.5-18.0)  gm/dl


 


Hct    36.3 L  (42-50)  %


 


MCV    93.8  ()  fl


 


MCH    30.5  (26-32)  pg


 


MCHC    32.5  (32-36)  g/dl


 


RDW    13.7  (11.5-14.0)  %


 


Plt Count    215  (150-450)  K/mm3


 


MPV    9.6  (7.5-11.0)  fl


 


Gran %    64.3  (36.0-66.0)  %


 


Eos # (Auto)    0.09  (0-0.5)  


 


Absolute Lymphs (auto)    1.71  (1.0-4.6)  


 


Absolute Monos (auto)    0.43  (0.0-1.3)  


 


Lymphocytes %    26.8  (24.0-44.0)  %


 


Monocytes %    6.7  (0.0-12.0)  %


 


Eosinophils %    1.4  (0.00-5.0)  %


 


Basophils %    0.8  (0.0-0.4)  %


 


Absolute Granulocytes    4.10  (1.4-6.9)  


 


Basophils #    0.05  (0-0.4)  


 


Sodium     (137-145)  mmol/L


 


Potassium     (3.5-5.1)  mmol/L


 


Chloride     ()  mmol/L


 


Carbon Dioxide     (22-30)  mmol/L


 


Anion Gap     (5-15)  MEQ/L


 


BUN     (9-20)  mg/dL


 


Creatinine     (0.66-1.25)  mg/dL


 


Estimated GFR     ML/MIN


 


Glucose     ()  mg/dL


 


Calcium     (8.4-10.2)  mg/dL


 


Total Bilirubin     (0.2-1.3)  mg/dL


 


AST     (17-59)  U/L


 


ALT     (0-50)  U/L


 


Alkaline Phosphatase     ()  U/L


 


Troponin I  < 0.012  < 0.012   (0.000-0.034)  ng/mL


 


Serum Total Protein     (6.3-8.2)  g/dL


 


Albumin     (3.5-5.0)  g/dL














  03/28/20 Range/Units





  05:25 


 


WBC   (4.0-10.5)  K/mm3


 


RBC   (4.1-5.6)  M/mm3


 


Hgb   (12.5-18.0)  gm/dl


 


Hct   (42-50)  %


 


MCV   ()  fl


 


MCH   (26-32)  pg


 


MCHC   (32-36)  g/dl


 


RDW   (11.5-14.0)  %


 


Plt Count   (150-450)  K/mm3


 


MPV   (7.5-11.0)  fl


 


Gran %   (36.0-66.0)  %


 


Eos # (Auto)   (0-0.5)  


 


Absolute Lymphs (auto)   (1.0-4.6)  


 


Absolute Monos (auto)   (0.0-1.3)  


 


Lymphocytes %   (24.0-44.0)  %


 


Monocytes %   (0.0-12.0)  %


 


Eosinophils %   (0.00-5.0)  %


 


Basophils %   (0.0-0.4)  %


 


Absolute Granulocytes   (1.4-6.9)  


 


Basophils #   (0-0.4)  


 


Sodium  139  (137-145)  mmol/L


 


Potassium  4.3  (3.5-5.1)  mmol/L


 


Chloride  107  ()  mmol/L


 


Carbon Dioxide  23  (22-30)  mmol/L


 


Anion Gap  13.1  (5-15)  MEQ/L


 


BUN  28 H  (9-20)  mg/dL


 


Creatinine  1.06  (0.66-1.25)  mg/dL


 


Estimated GFR  > 60.0  ML/MIN


 


Glucose  135 H  ()  mg/dL


 


Calcium  8.6  (8.4-10.2)  mg/dL


 


Total Bilirubin  0.60  (0.2-1.3)  mg/dL


 


AST  26  (17-59)  U/L


 


ALT  13  (0-50)  U/L


 


Alkaline Phosphatase  74  ()  U/L


 


Troponin I   (0.000-0.034)  ng/mL


 


Serum Total Protein  7.6  (6.3-8.2)  g/dL


 


Albumin  3.8  (3.5-5.0)  g/dL








 Accuchecks











Date                           03/27/20


 


Date                           03/27/20


 


Date                           03/27/20


 


Time                           20:00


 


Time                           16:22


 


Time                           12:50


 


Accucheck Value:               158


 


Accucheck Value:               96


 


Accucheck Value:               115


 


Accucheck Value:               118


 


Accucheck Value:               91


 


Accucheck Value:               85

















- Radiology Impressions


Radiology Exams & Impressions: 


 Radiology Procedures











 Category Date Time Status


 


 CHEST 1 VIEW (PORTABLE) Stat Exams  03/27/20 07:49 Completed


 


 HEAD WITHOUT CONTRAST [CT] Stat Exams  03/27/20 07:49 Completed


 


 LUMBAR COMPLETE (MIN 4 VIEWS) Stat Exams  03/27/20 08:13 Completed


 


 SACRUM AND COCCYX Stat Exams  03/27/20 08:15 Completed














Assessment/Plan


(1) Frequent falls


Current Visit: Yes   Status: Acute   


Assessment & Plan: 


May have been med side effect of Cymbalta; holding that.  He was reported to be 

orthostatic in ER, so may have had some mild dehydration, although labs did not 

indicate that.  The sx did also start just after his pacemaker was changed, so 

may need to have that interrogated.  However, doing well here, no issues, and 

workup negative.  Due to concern about jairon infection, will d/c pt to 

home.


Code(s): R29.6 - REPEATED FALLS   





(2) Orthostatic hypotension


Current Visit: Yes   Status: Acute   Code(s): I95.1 - ORTHOSTATIC HYPOTENSION   





(3) General weakness


Current Visit: Yes   Status: Acute   Code(s): R53.1 - WEAKNESS   





(4) Open knee wound


Current Visit: Yes   Status: Acute   


Qualifiers: 


   Encounter type: initial encounter   Laterality: left   Qualified Code(s): 

S81.002A - Unspecified open wound, left knee, initial encounter   


Assessment & Plan: 


Advised warm compress, gently work on eschar, cover with salve (he has some 

from Dr. Guo) and dressing. 


Code(s): S81.009A - UNSPECIFIED OPEN WOUND, UNSPECIFIED KNEE, INITIAL ENCOUNTER

   





(5) Diabetes mellitus


Current Visit: Yes   Status: Chronic   


Qualifiers: 


   Diabetes mellitus type: type 2   Diabetes mellitus long term insulin use: 

with long term use   Diabetes mellitus complication status: with circulatory 

complication   Diabetes mellitus complication detail: with peripheral 

angiopathy without gangrene   Qualified Code(s): E11.51 - Type 2 diabetes 

mellitus with diabetic peripheral angiopathy without gangrene; Z79.4 - Long 

term (current) use of insulin   


Code(s): E11.9 - TYPE 2 DIABETES MELLITUS WITHOUT COMPLICATIONS   





(6) Coronary artery disease


Current Visit: Yes   Status: Chronic   


Qualifiers: 


   Coronary Disease-Associated Artery/Lesion type: bypass graft   Native vs. 

transplanted heart: native heart   Associated angina: with stable angina   

Qualified Code(s): I25.708 - Atherosclerosis of coronary artery bypass graft(s)

, unspecified, with other forms of angina pectoris   


Code(s): I25.10 - ATHSCL HEART DISEASE OF NATIVE CORONARY ARTERY W/O ANG PCTRS 

  





(7) Peripheral artery disease


Current Visit: Yes   Status: Chronic   Code(s): I73.9 - PERIPHERAL VASCULAR 

DISEASE, UNSPECIFIED   





(8) Pacemaker


Current Visit: Yes   Status: Chronic   


Assessment & Plan: 


Did start having increased sx after pacer changed.


Code(s): Z95.0 - PRESENCE OF CARDIAC PACEMAKER   





(9) artificial aortic valve


Current Visit: Yes   Status: Chronic   





Hospital Summary





- Hospital Course


Hospital Course: 





 is a 79 year old male pt of Dr. Cowan with PMHx CAD (hx CABG), PVD (hx 

stents), artifical heart valve (aorta), pacemaker (replaced 2 wks ago), DM, 

diabetic peripheral neuropathy, HTN, ANEL, and chronic back pain who was 

admitted through ER with frequent falls and weakness.  He denies sury 

dizziness.  About 2 weeks ago he started having some falls, which gradually 

increased until the day prior to admission he had falls x5.  He did have 

increased low back pain with the falls.  He is on xarelto and plavix.  





In ER, his CT head and xr lumbar spine were neg.  His labs were grossly neg.  

CXR nonacute.  Troponins have been nl x 5.  His labs this morning are again 

unremarkable.





Since admission, he has not fallen.  He has been up around his room and to the 

bathroom with no issues.  He was found to be orthostatic in ER.  His BP have 

been 130s-140s systolic.  He has been on telemetry.





He recently started generic cymbalta and thinks that may be contributing, which 

is certainly possible.  He has stopped that and will continue holding it until 

he follows up with Dr. Cowan.





In light of the current coronavirus pandemic (and a pending admission of 

possible COVID-19 this morning), I am discharging this very high risk patient 

to home.  I advised him to stay in; be careful of falling of course, but 

minimize contacts with others.  I am sending a message to Dr. Cowan who can 

contact the pt next week to see if she wants him to f/u with her or cardiology 

or both.





- Vitals & Intake/Output


Vital Signs: 


 Vital Signs











Temperature  97.6 F   03/28/20 08:00


 


Pulse Rate  60   03/28/20 08:00


 


Respiratory Rate  18   03/28/20 08:00


 


Blood Pressure  134/61   03/28/20 08:00


 


O2 Sat by Pulse Oximetry  96   03/28/20 08:00











Intake & Output: 


 Intake & Output











 03/25/20 03/26/20 03/27/20 03/28/20





 11:59 11:59 11:59 11:59


 


Intake Total    1579


 


Output Total    1800


 


Balance    -221


 


Weight   130 kg 129.9 kg














- Lab


Result Diagrams: 


 03/28/20 05:25





 03/28/20 05:25


Lab Results-Last 24 Hrs: 


 Accuchecks











Date                           03/27/20


 


Date                           03/27/20


 


Date                           03/27/20


 


Time                           20:00


 


Time                           16:22


 


Time                           12:50


 


Accucheck Value:               158


 


Accucheck Value:               96


 


Accucheck Value:               115


 


Accucheck Value:               118


 


Accucheck Value:               91


 


Accucheck Value:               85











 Lab Results-Last 24 Hours











  03/27/20 03/27/20 03/27/20 Range/Units





  11:05 13:59 13:59 


 


WBC     (4.0-10.5)  K/mm3


 


RBC     (4.1-5.6)  M/mm3


 


Hgb     (12.5-18.0)  gm/dl


 


Hct     (42-50)  %


 


MCV     ()  fl


 


MCH     (26-32)  pg


 


MCHC     (32-36)  g/dl


 


RDW     (11.5-14.0)  %


 


Plt Count     (150-450)  K/mm3


 


MPV     (7.5-11.0)  fl


 


Gran %     (36.0-66.0)  %


 


Eos # (Auto)     (0-0.5)  


 


Absolute Lymphs (auto)     (1.0-4.6)  


 


Absolute Monos (auto)     (0.0-1.3)  


 


Lymphocytes %     (24.0-44.0)  %


 


Monocytes %     (0.0-12.0)  %


 


Eosinophils %     (0.00-5.0)  %


 


Basophils %     (0.0-0.4)  %


 


Absolute Granulocytes     (1.4-6.9)  


 


Basophils #     (0-0.4)  


 


Sodium    140  (137-145)  mmol/L


 


Potassium    3.8  (3.5-5.1)  mmol/L


 


Chloride    107  ()  mmol/L


 


Carbon Dioxide    27  (22-30)  mmol/L


 


Anion Gap    10.2  (5-15)  MEQ/L


 


BUN    34 H  (9-20)  mg/dL


 


Creatinine    1.30 H  (0.66-1.25)  mg/dL


 


Estimated GFR    56.9  ML/MIN


 


Glucose    93  ()  mg/dL


 


Calcium    8.6  (8.4-10.2)  mg/dL


 


Total Bilirubin     (0.2-1.3)  mg/dL


 


AST     (17-59)  U/L


 


ALT     (0-50)  U/L


 


Alkaline Phosphatase     ()  U/L


 


Troponin I  < 0.012  < 0.012   (0.000-0.034)  ng/mL


 


Serum Total Protein     (6.3-8.2)  g/dL


 


Albumin     (3.5-5.0)  g/dL














  03/27/20 03/27/20 03/28/20 Range/Units





  17:58 20:20 05:25 


 


WBC    6.4  (4.0-10.5)  K/mm3


 


RBC    3.87 L  (4.1-5.6)  M/mm3


 


Hgb    11.8 L  (12.5-18.0)  gm/dl


 


Hct    36.3 L  (42-50)  %


 


MCV    93.8  ()  fl


 


MCH    30.5  (26-32)  pg


 


MCHC    32.5  (32-36)  g/dl


 


RDW    13.7  (11.5-14.0)  %


 


Plt Count    215  (150-450)  K/mm3


 


MPV    9.6  (7.5-11.0)  fl


 


Gran %    64.3  (36.0-66.0)  %


 


Eos # (Auto)    0.09  (0-0.5)  


 


Absolute Lymphs (auto)    1.71  (1.0-4.6)  


 


Absolute Monos (auto)    0.43  (0.0-1.3)  


 


Lymphocytes %    26.8  (24.0-44.0)  %


 


Monocytes %    6.7  (0.0-12.0)  %


 


Eosinophils %    1.4  (0.00-5.0)  %


 


Basophils %    0.8  (0.0-0.4)  %


 


Absolute Granulocytes    4.10  (1.4-6.9)  


 


Basophils #    0.05  (0-0.4)  


 


Sodium     (137-145)  mmol/L


 


Potassium     (3.5-5.1)  mmol/L


 


Chloride     ()  mmol/L


 


Carbon Dioxide     (22-30)  mmol/L


 


Anion Gap     (5-15)  MEQ/L


 


BUN     (9-20)  mg/dL


 


Creatinine     (0.66-1.25)  mg/dL


 


Estimated GFR     ML/MIN


 


Glucose     ()  mg/dL


 


Calcium     (8.4-10.2)  mg/dL


 


Total Bilirubin     (0.2-1.3)  mg/dL


 


AST     (17-59)  U/L


 


ALT     (0-50)  U/L


 


Alkaline Phosphatase     ()  U/L


 


Troponin I  < 0.012  < 0.012   (0.000-0.034)  ng/mL


 


Serum Total Protein     (6.3-8.2)  g/dL


 


Albumin     (3.5-5.0)  g/dL














  03/28/20 Range/Units





  05:25 


 


WBC   (4.0-10.5)  K/mm3


 


RBC   (4.1-5.6)  M/mm3


 


Hgb   (12.5-18.0)  gm/dl


 


Hct   (42-50)  %


 


MCV   ()  fl


 


MCH   (26-32)  pg


 


MCHC   (32-36)  g/dl


 


RDW   (11.5-14.0)  %


 


Plt Count   (150-450)  K/mm3


 


MPV   (7.5-11.0)  fl


 


Gran %   (36.0-66.0)  %


 


Eos # (Auto)   (0-0.5)  


 


Absolute Lymphs (auto)   (1.0-4.6)  


 


Absolute Monos (auto)   (0.0-1.3)  


 


Lymphocytes %   (24.0-44.0)  %


 


Monocytes %   (0.0-12.0)  %


 


Eosinophils %   (0.00-5.0)  %


 


Basophils %   (0.0-0.4)  %


 


Absolute Granulocytes   (1.4-6.9)  


 


Basophils #   (0-0.4)  


 


Sodium  139  (137-145)  mmol/L


 


Potassium  4.3  (3.5-5.1)  mmol/L


 


Chloride  107  ()  mmol/L


 


Carbon Dioxide  23  (22-30)  mmol/L


 


Anion Gap  13.1  (5-15)  MEQ/L


 


BUN  28 H  (9-20)  mg/dL


 


Creatinine  1.06  (0.66-1.25)  mg/dL


 


Estimated GFR  > 60.0  ML/MIN


 


Glucose  135 H  ()  mg/dL


 


Calcium  8.6  (8.4-10.2)  mg/dL


 


Total Bilirubin  0.60  (0.2-1.3)  mg/dL


 


AST  26  (17-59)  U/L


 


ALT  13  (0-50)  U/L


 


Alkaline Phosphatase  74  ()  U/L


 


Troponin I   (0.000-0.034)  ng/mL


 


Serum Total Protein  7.6  (6.3-8.2)  g/dL


 


Albumin  3.8  (3.5-5.0)  g/dL











Micro Results-Entire Visit: 


 Accuchecks











Date                           03/27/20


 


Date                           03/27/20


 


Date                           03/27/20


 


Time                           20:00


 


Time                           16:22


 


Time                           12:50


 


Accucheck Value:               158


 


Accucheck Value:               96


 


Accucheck Value:               115


 


Accucheck Value:               118


 


Accucheck Value:               91


 


Accucheck Value:               85

















- Radiology Exams


Ordered Rad Exams-Entire Visit: 


 Radiology Procedures











 Category Date Time Status


 


 CHEST 1 VIEW (PORTABLE) Stat Exams  03/27/20 07:49 Completed


 


 HEAD WITHOUT CONTRAST [CT] Stat Exams  03/27/20 07:49 Completed


 


 LUMBAR COMPLETE (MIN 4 VIEWS) Stat Exams  03/27/20 08:13 Completed


 


 SACRUM AND COCCYX Stat Exams  03/27/20 08:15 Completed














- Procedures and Test


Procedures and Tests throughout Hospitalization: 


 Therapy Orders & Screens





03/27/20 13:15


PT Screen per Nursing Assess ONCE 


   Comment: Protocol Order


   Physician Instructions: Greater than 3 points order PT Admission Screenin


   Reason For Exam: Triggered on Admission


   Diagnosis: General weakness.


   Open Wound/Cellutlitis/Pressure Ulcers: No


   Acute Fx/ORIF/Change in wt bearing status: Yes


   Severe MUSCULOSKELETAL pain: Yes


   ADL Dysfunction: Yes


   Acute CVA w/Hemiparesis/Hemiplegia: No


   Decreased Functional Mobility/Strength: Yes


   Sprain/Strain: No


   Acute Post-op Mobility Dysfunction: No


   Total Points: 14





03/27/20 14:34


PT Eval & Treat (MD Order) ROUTINE 


   Reason for Eval:: weakness and fall


   Diagnosis: General weakness.














- Discharge


Disposition: Home, Self-Care


Condition: Stable


Prescriptions: 


Continue


   Magnesium Oxide 400 mg*** [Mag-Ox 400***] 400 mg PO DAILY


   Docusate Sodium 100 mg*** [Colace 100 MG***] 100 mg PO DAILY


   Pravastatin Sodium [Pravachol] 40 mg PO DAILY


   Clopidogrel Bisulfate 75 mg*** [PLAVIX 75 MG Tablet***] 75 mg PO DAILY


   Metoprolol Succinate 50 mg*** [Toprol Xl 50 MG***] 50 mg PO DAILY


   Furosemide 40 mg*** [Lasix 40 MG***] 40 mg PO DAILY


   Rivaroxaban [Xarelto] 15 mg PO DAILY


   Sacubitril/Valsartan [Entresto 49 mg-51 mg Tablet] 1 each PO BID


   Insulin Glargine,Hum.rec.anlog [Lantus] 100 unit SQ LUNCH


   Insulin Lispro [Humalog] 100 unit SQ DAILY PRN


     PRN Reason: Hyperglycemia





Discontinued


   Duloxetine HCl 30 mg*** [Cymbalta 30 MG Capsule***] 60 mg PO DAILY


Follow up with: 


RICHARD COWAN [Primary Care Provider] - 1 Week

## 2020-08-15 ENCOUNTER — HOSPITAL ENCOUNTER (EMERGENCY)
Dept: HOSPITAL 33 - ED | Age: 80
Discharge: HOME | End: 2020-08-15
Payer: MEDICARE

## 2020-08-15 VITALS — SYSTOLIC BLOOD PRESSURE: 114 MMHG | HEART RATE: 76 BPM | OXYGEN SATURATION: 97 % | DIASTOLIC BLOOD PRESSURE: 56 MMHG

## 2020-08-15 DIAGNOSIS — Z95.1: ICD-10-CM

## 2020-08-15 DIAGNOSIS — E78.5: ICD-10-CM

## 2020-08-15 DIAGNOSIS — Z79.899: ICD-10-CM

## 2020-08-15 DIAGNOSIS — J40: Primary | ICD-10-CM

## 2020-08-15 DIAGNOSIS — I25.10: ICD-10-CM

## 2020-08-15 DIAGNOSIS — Z79.01: ICD-10-CM

## 2020-08-15 DIAGNOSIS — I10: ICD-10-CM

## 2020-08-15 DIAGNOSIS — Z95.0: ICD-10-CM

## 2020-08-15 DIAGNOSIS — J44.9: ICD-10-CM

## 2020-08-15 PROCEDURE — 71046 X-RAY EXAM CHEST 2 VIEWS: CPT

## 2020-08-15 PROCEDURE — 99283 EMERGENCY DEPT VISIT LOW MDM: CPT

## 2020-08-15 NOTE — XRAY
Indication: Cough.



Comparison: March 27, 2020.



PA/lateral chest obtained.  Lateral view limited due to respiration/motion

artifact.  Minimal right midlung subsegmental atelectasis/scarring.  Remaining

heart and lungs unremarkable again with cardiac valve replacement, CABG, and

left pacemaker.  Bony thorax intact again with mild degenerative changes.



Impression: Nonacute chest with chronic features.

## 2020-08-15 NOTE — ERPHSYRPT
- History of Present Illness


Time Seen by Provider: 08/15/20 16:38


Source: patient


Exam Limitations: no limitations


Patient Subjective Stated Complaint: Pt states "I have had a cough for a couple 

of days.  I am not short of breath just coughing up thick yellow stuff."


Triage Nursing Assessment: Pt presented alert and oriented X 3, skin pwd Pt 

ambulates with a slow shuffling gait.  Pt able to speak in clear full sentences.

 Pt in no apparent respiratory distress.


Physician History: 





80 years old male with history of coronary artery disease status post CABG, 

hypertension, hyperlipidemia, congestive heart failure status post pacemaker 

placement, on Xarelto, COPD presented in the ER with chief complaint of cough 

for the last 2 days.  Patient reports "I have been brought here against my 

available by the lady next door for evaluation of cough".  Patient reports he 

slept in a cold 2 days ago with no blanket on and woke up with mild cough 

productive of clear to yellow thick sputum with no hemoptysis.  Denies any fever

chills or shortness of breath.  No palpitations.  Denies any chest pain.  Denies

any sick contact.  Does have lower extremity swelling which is chronic with no 

worsening.  Denies any dyspnea on exertion.  Denies any other URI symptoms.


Timing/Duration: day(s) (2)


Cough Quality/Degree: mild, productive cough, sputum


Possible Cause: no prior episodes


Modifying Factors: Improves With: nothing


Associated Symptoms: denies symptoms


Allergies/Adverse Reactions: 








No Known Drug Allergies Allergy (Verified 03/27/20 07:34)


   





Home Medications: 








Clopidogrel Bisulfate 75 mg*** [PLAVIX 75 MG Tablet***] 75 mg PO DAILY 06/12/19 

[History]


Docusate Sodium 100 mg*** [Colace 100 MG***] 100 mg PO DAILY 06/12/19 [History]


Furosemide 40 mg*** [Lasix 40 MG***] 40 mg PO DAILY 06/12/19 [History]


Magnesium Oxide 400 mg*** [Mag-Ox 400***] 400 mg PO DAILY 06/12/19 [History]


Metoprolol Succinate 50 mg*** [Toprol Xl 50 MG***] 50 mg PO DAILY 06/12/19 

[History]


Pravastatin Sodium [Pravachol] 40 mg PO DAILY 06/12/19 [History]


Rivaroxaban [Xarelto] 15 mg PO DAILY 06/12/19 [History]


Sacubitril/Valsartan [Entresto 49 mg-51 mg Tablet] 1 each PO BID 06/12/19 

[History]


Insulin Glargine,Hum.rec.anlog [Lantus] 100 unit SQ LUNCH 06/20/19 [History]


Insulin Lispro [Humalog] 100 unit SQ DAILY PRN 06/20/19 [History]





Hx Tetanus, Diphtheria Vaccination/Date Given: No


Hx Influenza Vaccination/Date Given: Yes


Hx Pneumococcal Vaccination/Date Given: Yes


Immunizations Up to Date: Yes





Travel Risk





- International Travel


Have you traveled outside of the country in past 3 weeks: No





- Coronavirus Screening


Symptoms: Cough: New Onset


Close contact with a COVID-19 positive Pt in past 14-21 Days: No





- Review of Systems


Constitutional: No Symptoms


Eyes: No Symptoms


Ears, Nose, & Throat: No Symptoms


Respiratory: Cough, No Dyspnea, No Wheezing


Cardiac: Edema


Abdominal/Gastrointestinal: No Symptoms


Genitourinary Symptoms: No Symptoms


Musculoskeletal: No Symptoms


Neurological: No Symptoms


Psychological: No Symptoms


Endocrine: No Symptoms


Hematologic/Lymphatic: No Symptoms


Immunological/Allergic: No Symptoms





- Past Medical History


Pertinent Past Medical History: Yes


Neurological History: Stroke


ENT History: Cataracts


Cardiac History: Congestive Heart Failure, Coronary Artery Disease, High 

Cholesterol, Hypertension


Respiratory History: Sleep Apnea


Endocrine Medical History: Diabetes Type II


Musculoskeletal History: Arthritis


GI Medical History: No Pertinent History


 History: No Pertinent History


Psycho-Social History: No Pertinent History


Male Reproductive Disorders: No Pertinent History





- Past Surgical History


Past Surgical History: Yes


Neuro Surgical History: No Pertinent History


Cardiac: CABG, Pacemaker


Respiratory: No Pertinent History


Gastrointestinal: No Pertinent History


Genitourinary: No Pertinent History


Musculoskeletal: Other


Male Surgical History: No Pertinent History


Other Surgical History: right knee,  right shoulder surgery.cancer removed on 

lip and colonscopy in past





- Social History


Smoking Status: Former smoker


Exposure to second hand smoke: Yes


Drug Use: none


Patient Lives Alone: No





- Nursing Vital Signs


Nursing Vital Signs: 


                               Initial Vital Signs











Temperature  98.4 F   08/15/20 16:19


 


Pulse Rate  82   08/15/20 16:19


 


Respiratory Rate  22   08/15/20 16:19


 


Blood Pressure  89/33   08/15/20 16:19


 


O2 Sat by Pulse Oximetry  96   08/15/20 16:19








                                   Pain Scale











Pain Intensity                 0

















- Physical Exam


General Appearance: no apparent distress, alert


Eye Exam: PERRL/EOMI, eyes nml inspection


Ears, Nose, Throat Exam: normal ENT inspection, pharynx normal


Neck Exam: normal inspection, supple, full range of motion


Respiratory Exam: normal breath sounds, lungs clear


Cardiovascular Exam: regular rate/rhythm, normal heart sounds


Back Exam: normal inspection


Extremity Exam: pedal edema


Neurologic Exam: alert, oriented x 3, cooperative


Skin Exam: normal color


**SpO2 Interpretation**: normal


SpO2: 96


O2 Delivery: Room Air


Ordered Tests: 


                               Active Orders 24 hr











 Category Date Time Status


 


 CHEST 2 VIEWS (PA AND LAT) Stat Exams  08/15/20 16:29 Completed








Medication Summary














Discontinued Medications














Generic Name Dose Route Start Last Admin





  Trade Name Freq  PRN Reason Stop Dose Admin


 


Azithromycin  500 mg  08/15/20 17:35 





  Zithromax 250 Mg Tablet***  PO  08/15/20 17:36 





  STAT ONE  














- Progress


Progress: re-examined


Air Movement: good


Progress Note: 





08/15/20 17:33


80 years old is evaluated for cough for 2 days.  No fever chills or shortness of

 breath.  No chest pain.  No signs of CHF exacerbation.  Patient refused EKG and

 blood work.  I have obtained x-rays which did not show any focal 

consolidations.  I believe patient is developing bronchitis and because of his 

multiple other risk factor I would start him on Z-Justin and recommended Mucinex to

 take as needed.  Discussed signs symptoms of worsening needing return to ER 

which he seems understanding.  Patient is not in any distress and nontoxic 

appearance.


Blood Culture(s) Obtained: No


Antibiotics given: Yes


Counseled pt/family regarding: diagnosis, need for follow-up, rad results





- Departure


Departure Disposition: Home


Clinical Impression: 


 Bronchitis





Condition: Stable


Critical Care Time: No


Referrals: 


TABBY RIVAS MD [Primary Care Provider] -  (In 2 days for reevaluation)


Instructions:  Cough, Adult (DC)


Additional Instructions: 


Use inhaler as needed.  Use Mucinex for cough.  Follow-up with your primary care

physician for reevaluation.  Return to ER for worsening.


Prescriptions: 


Albuterol 8 gm Mdi Hfa*** [Ventolin Hfa MDI***] 8 gm IH Q4H #1 hfa.aer.ad


Azithromycin 250 mg*** [Zithromax 250 MG TABLET***] 250 mg PO DAILY #4 tablet

## 2022-12-05 ENCOUNTER — HOSPITAL ENCOUNTER (INPATIENT)
Dept: HOSPITAL 33 - ED | Age: 82
LOS: 4 days | Discharge: HOME | DRG: 179 | End: 2022-12-09
Attending: FAMILY MEDICINE | Admitting: FAMILY MEDICINE
Payer: MEDICARE

## 2022-12-05 DIAGNOSIS — I11.0: ICD-10-CM

## 2022-12-05 DIAGNOSIS — Z79.01: ICD-10-CM

## 2022-12-05 DIAGNOSIS — I50.9: ICD-10-CM

## 2022-12-05 DIAGNOSIS — R53.1: ICD-10-CM

## 2022-12-05 DIAGNOSIS — Z85.828: ICD-10-CM

## 2022-12-05 DIAGNOSIS — E86.0: ICD-10-CM

## 2022-12-05 DIAGNOSIS — E78.5: ICD-10-CM

## 2022-12-05 DIAGNOSIS — Z79.899: ICD-10-CM

## 2022-12-05 DIAGNOSIS — I25.10: ICD-10-CM

## 2022-12-05 DIAGNOSIS — E11.22: ICD-10-CM

## 2022-12-05 DIAGNOSIS — U07.1: Primary | ICD-10-CM

## 2022-12-05 DIAGNOSIS — Z20.828: ICD-10-CM

## 2022-12-05 LAB
ALBUMIN SERPL-MCNC: 4.5 G/DL (ref 3.5–5)
ALP SERPL-CCNC: 71 U/L (ref 38–126)
ALT SERPL-CCNC: 15 U/L (ref 0–50)
ANION GAP SERPL CALC-SCNC: 12.7 MEQ/L (ref 5–15)
AST SERPL QL: 26 U/L (ref 17–59)
BASOPHILS # BLD AUTO: 0.07 X10^3/UL (ref 0–0.4)
BILIRUB BLD-MCNC: 0.6 MG/DL (ref 0.2–1.3)
BNP SERPL-MCNC: 1180 PG/ML (ref 0–1800)
BUN SERPL-MCNC: 25 MG/DL (ref 9–20)
CALCIUM SPEC-MCNC: 8.9 MG/DL (ref 8.4–10.2)
CHLORIDE SERPL-SCNC: 105 MMOL/L (ref 98–107)
CO2 SERPL-SCNC: 25 MMOL/L (ref 22–30)
CREAT SERPL-MCNC: 1.77 MG/DL (ref 0.66–1.25)
EOSINOPHIL # BLD AUTO: 0.02 X10^3/UL (ref 0–0.5)
FLUAV AG NPH QL IA: NEGATIVE
FLUBV AG NPH QL IA: NEGATIVE
GFR SERPLBLD BASED ON 1.73 SQ M-ARVRAT: 39.5 ML/MIN
GLUCOSE SERPL-MCNC: 53 MG/DL (ref 74–106)
HCT VFR BLD AUTO: 37.2 % (ref 42–50)
HGB BLD-MCNC: 12 G/DL (ref 12.5–18)
LYMPHOCYTES # SPEC AUTO: 1.35 X10^3/UL (ref 1–4.6)
MCH RBC QN AUTO: 31.3 PG (ref 26–32)
MCHC RBC AUTO-ENTMCNC: 32.3 G/DL (ref 32–36)
MONOCYTES # BLD AUTO: 0.83 X10^3/UL (ref 0–1.3)
PLATELET # BLD AUTO: 209 X10^3/UL (ref 150–450)
POTASSIUM SERPLBLD-SCNC: 4.2 MMOL/L (ref 3.5–5.1)
PROT SERPL-MCNC: 8.7 G/DL (ref 6.3–8.2)
RBC # BLD AUTO: 3.84 X10^6/UL (ref 4.1–5.6)
RSV AG SPEC QL IA: NEGATIVE
SARS-COV-2 AG RESP QL IA.RAPID: POSITIVE
SODIUM SERPL-SCNC: 139 MMOL/L (ref 137–145)
WBC # BLD AUTO: 8.8 X10^3/UL (ref 4–10.5)

## 2022-12-05 PROCEDURE — 97161 PT EVAL LOW COMPLEX 20 MIN: CPT

## 2022-12-05 PROCEDURE — 36415 COLL VENOUS BLD VENIPUNCTURE: CPT

## 2022-12-05 PROCEDURE — 99285 EMERGENCY DEPT VISIT HI MDM: CPT

## 2022-12-05 PROCEDURE — 82947 ASSAY GLUCOSE BLOOD QUANT: CPT

## 2022-12-05 PROCEDURE — 80048 BASIC METABOLIC PNL TOTAL CA: CPT

## 2022-12-05 PROCEDURE — 96365 THER/PROPH/DIAG IV INF INIT: CPT

## 2022-12-05 PROCEDURE — 93005 ELECTROCARDIOGRAM TRACING: CPT

## 2022-12-05 PROCEDURE — 71045 X-RAY EXAM CHEST 1 VIEW: CPT

## 2022-12-05 PROCEDURE — 96361 HYDRATE IV INFUSION ADD-ON: CPT

## 2022-12-05 PROCEDURE — 94760 N-INVAS EAR/PLS OXIMETRY 1: CPT

## 2022-12-05 PROCEDURE — 80053 COMPREHEN METABOLIC PANEL: CPT

## 2022-12-05 PROCEDURE — 94762 N-INVAS EAR/PLS OXIMTRY CONT: CPT

## 2022-12-05 PROCEDURE — 81015 MICROSCOPIC EXAM OF URINE: CPT

## 2022-12-05 PROCEDURE — 83036 HEMOGLOBIN GLYCOSYLATED A1C: CPT

## 2022-12-05 PROCEDURE — 83735 ASSAY OF MAGNESIUM: CPT

## 2022-12-05 PROCEDURE — 85025 COMPLETE CBC W/AUTO DIFF WBC: CPT

## 2022-12-05 PROCEDURE — 83880 ASSAY OF NATRIURETIC PEPTIDE: CPT

## 2022-12-05 PROCEDURE — 85379 FIBRIN DEGRADATION QUANT: CPT

## 2022-12-05 PROCEDURE — 0241U: CPT

## 2022-12-05 PROCEDURE — 97530 THERAPEUTIC ACTIVITIES: CPT

## 2022-12-05 PROCEDURE — 96360 HYDRATION IV INFUSION INIT: CPT

## 2022-12-05 PROCEDURE — 93041 RHYTHM ECG TRACING: CPT

## 2022-12-05 PROCEDURE — 84484 ASSAY OF TROPONIN QUANT: CPT

## 2022-12-05 NOTE — ERPHSYRPT
- History of Present Illness


Time Seen by Provider: 12/05/22 19:46


Source: patient


Exam Limitations: no limitations


Patient Subjective Stated Complaint: Patient c/o not feeling well for a few 

days. He states that he is tired, weak, and has a cough.


Triage Nursing Assessment: Patient brought into the ED by ambulance. He is alert

and oriented but drowsy; falling asleep during assessment. No SOB. A moist, non-

productive cough is noted. Skin is hot to touch. Lungs clear.


Physician History: 


Patient is a 82-year-old male presents to our ED via EMS for evaluation of 

generalized weakness.  Patient has been feeling unwell for several days.  

Patient states he feels tired and weak.  Patient states that walking is 

difficult because of weakness.  Cough is dry nonproductive.  No obvious fevers. 

Decreased p.o.  No decreased urine output.  No rash.  Symptoms are constant.  

Symptoms are moderate in intensity.  No specific worsening improving factors.  

Patient voices no other complaints or concerns at this time.





Timing/Duration: day(s) (3 days ago)


Severity: moderate


Associated Symptoms: denies symptoms


Allergies/Adverse Reactions: 








No Known Drug Allergies Allergy (Verified 12/05/22 18:10)


   





Home Medications: 








Clopidogrel Bisulfate [PLAVIX Tablet] 75 mg PO DAILY 06/12/19 [History]


Docusate Sodium 100 mg*** [Docusate Sodium 100 MG***] 100 mg PO DAILY 06/12/19 

[History]


Furosemide 40 mg*** [Lasix 40 MG***] 40 mg PO DAILY 06/12/19 [History]


Magnesium Oxide 400 mg*** [Mag-Ox 400***] 400 mg PO DAILY 06/12/19 [History]


Metoprolol Succinate 50 mg*** [Toprol Xl 50 MG***] 50 mg PO DAILY 06/12/19 

[History]


Pravastatin Sodium [Pravachol] 40 mg PO DAILY 06/12/19 [History]


Rivaroxaban [Xarelto] 15 mg PO DAILY 06/12/19 [History]


Sacubitril/Valsartan [Entresto 49 mg-51 mg Tablet] 1 each PO BID 06/12/19 

[History]


Insulin Glargine,Hum.rec.anlog [Lantus] 100 unit SQ LUNCH 06/20/19 [History]


Insulin Lispro [Humalog] 100 unit SQ DAILY PRN 06/20/19 [History]





Hx Tetanus, Diphtheria Vaccination/Date Given: Yes


Hx Influenza Vaccination/Date Given: No


Hx Pneumococcal Vaccination/Date Given: Yes


Immunizations Up to Date: Yes





Travel Risk





- International Travel


Have you traveled outside of the country in past 3 weeks: No





- Coronavirus Screening


Are you exhibiting any of the following symptoms?: Yes


Symptoms: Fever, Cough: New Onset, Headaches/Body Aches/Fatigue


Close contact with a COVID-19 positive Pt in past 14-21 Days: No





- Vaccine Status


Have you recieved a Covid-19 vaccination: Yes


: Moderna





- Vaccination Dates


Date of 2cond Vaccination (if applicable): 2021





- Review of Systems


Constitutional: No Symptoms, No Fever, No Chills


Eyes: No Symptoms


Ears, Nose, & Throat: No Symptoms


Respiratory: No Symptoms, No Cough, No Dyspnea


Cardiac: No Symptoms, No Chest Pain, No Edema, No Syncope


Abdominal/Gastrointestinal: No Symptoms, No Abdominal Pain, No Nausea, No 

Vomiting, No Diarrhea


Genitourinary Symptoms: No Symptoms, No Dysuria


Musculoskeletal: No Symptoms, No Back Pain, No Neck Pain


Skin: No Rash


Neurological: No Dizziness, No Focal Weakness, No Sensory Changes


Psychological: No Symptoms


Endocrine: No Symptoms


Hematologic/Lymphatic: No Symptoms


Immunological/Allergic: No Symptoms


All Other Systems: Reviewed and Negative





- Past Medical History


Pertinent Past Medical History: Yes


Neurological History: Stroke


ENT History: Cataracts


Cardiac History: Congestive Heart Failure, Coronary Artery Disease, High 

Cholesterol, Hypertension, Myocardial Infarction (MI), Other


Respiratory History: Sleep Apnea


Endocrine Medical History: Diabetes Type II


Musculoskeletal History: Arthritis


GI Medical History: No Pertinent History


 History: No Pertinent History


Psycho-Social History: No Pertinent History


Male Reproductive Disorders: No Pertinent History





- Past Surgical History


Past Surgical History: Yes


Neuro Surgical History: No Pertinent History


Cardiac: CABG, Pacemaker


Respiratory: No Pertinent History


Gastrointestinal: No Pertinent History


Genitourinary: No Pertinent History


Musculoskeletal: Other


Male Surgical History: No Pertinent History


Other Surgical History: right knee,  right shoulder surgery.cancer removed on 

lip and colonscopy in past





- Social History


Smoking Status: Former smoker


Exposure to second hand smoke: Yes


Drug Use: none


Patient Lives Alone: Yes





- Nursing Vital Signs


Nursing Vital Signs: 


                               Initial Vital Signs











Temperature  98.7 F   12/05/22 18:15


 


Pulse Rate  67   12/05/22 18:15


 


Respiratory Rate  17   12/05/22 18:15


 


Blood Pressure  120/43   12/05/22 18:15


 


O2 Sat by Pulse Oximetry  91 L  12/05/22 18:15








                                   Pain Scale











Pain Intensity                 0

















- Physical Exam


General Appearance: no apparent distress, alert


Eye Exam: PERRL/EOMI, eyes nml inspection


Ears, Nose, Throat Exam: normal ENT inspection, TMs normal, pharynx normal, 

moist mucous membranes


Neck Exam: normal inspection, non-tender, supple, full range of motion


Respiratory Exam: normal breath sounds, lungs clear, airway intact, No 

respiratory distress


Cardiovascular Exam: regular rate/rhythm, normal heart sounds, normal peripheral

 pulses


Gastrointestinal/Abdomen Exam: soft, normal bowel sounds, No tenderness, No mass


Back Exam: normal inspection, normal range of motion, No CVA tenderness, No 

vertebral tenderness


Extremity Exam: normal inspection, normal range of motion, pelvis stable


Neurologic Exam: alert, oriented x 3, cooperative, normal mood/affect, nml 

cerebellar function, nml station & gait, sensation nml, No motor deficits


Skin Exam: normal color, warm, dry, No rash


Lymphatic Exam: No adenopathy


**SpO2 Interpretation**: normal


SpO2: 89


O2 Delivery: Room Air





- Course


Nursing assessment & vital signs reviewed: Yes


EKG Interpreted by Me: RATE (70 ventricular paced rhythm)





- Radiology Exams


  ** Chest


X-ray Interpretation: Interpreted by me (Clear lungs.  Normal cardiac 

silhouette.  History of CABG and pacemaker.  Intact bony thorax)


Ordered Tests: 


                               Active Orders 24 hr











 Category Date Time Status


 


 Cardiac Monitor STAT Care  12/05/22 18:59 Active


 


 EKG-ER Only STAT Care  12/05/22 18:59 Active


 


 IV Insertion STAT Care  12/05/22 18:59 Active


 


 Pulse Oximetry (ED) STAT Care  12/05/22 18:59 Active


 


 CHEST 1 VIEW (PORTABLE) Stat Exams  12/05/22 20:06 Taken


 


 BNP [NT PRO BNP] Stat Lab  12/05/22 22:23 Ordered


 


 CBC W DIFF Stat Lab  12/05/22 18:10 Completed


 


 CMP Stat Lab  12/05/22 18:10 Completed


 


 D-DIMER QUANTITATIVE Stat Lab  12/05/22 22:23 Ordered


 


 NT PRO BNP Stat Lab  12/05/22 18:10 Completed


 


 TROPONIN Q4H Lab  12/05/22 18:10 Completed


 


 TROPONIN Q4H Lab  12/05/22 23:00 Ordered


 


 TROPONIN Q4H Lab  12/06/22 03:00 Ordered


 


 UA W/RFX CULTURE Stat Lab  12/05/22 Ordered








Medication Summary











Generic Name Dose Route Start Last Admin





  Trade Name Ok  PRN Reason Stop Dose Admin


 


Sodium Chloride  1,000 mls @ 100 mls/hr  12/05/22 19:00 





  Sodium Chloride 0.9% 1000 Ml  IV  01/04/23 18:59 





  .Q10H SUNG  


 


Remdesivir 200 mg/ Sodium  250 mls @ 125 mls/hr  12/05/22 22:24 





  Chloride  IV  12/06/22 00:23 





  ONCE ONE  














Discontinued Medications














Generic Name Dose Route Start Last Admin





  Trade Name Freq  PRN Reason Stop Dose Admin


 


Enoxaparin Sodium  40 mg  12/05/22 22:24 





  Enoxaparin Sodium*** 40 Mg/0.4 Ml Syringe  SQ  12/05/22 22:25 





  STAT ONE  











Lab/Rad Data: 


                           Laboratory Result Diagrams





                                 12/05/22 18:10 





                                 12/05/22 18:10 





                               Laboratory Results











  12/05/22 12/05/22 12/05/22 Range/Units





  18:19 18:10 18:10 


 


WBC     (4.0-10.5)  x10^3/uL


 


RBC     (4.1-5.6)  x10^6/uL


 


Hgb     (12.5-18.0)  g/dL


 


Hct     (42-50)  %


 


MCV     ()  fL


 


MCH     (26-32)  pg


 


MCHC     (32-36)  g/dL


 


RDW     (11.5-14.0)  %


 


Plt Count     (150-450)  x10^3/uL


 


MPV     (7.5-11.0)  fL


 


Gran %     (36.0-66.0)  %


 


Immature Gran % (Auto)     (0.00-0.4)  %


 


Nucleat RBC Rel Count     (0.00-0.1)  %


 


Eos # (Auto)     (0-0.5)  x10^3/uL


 


Immature Gran # (Auto)     (0.00-0.03)  x10^3u/L


 


Absolute Lymphs (auto)     (1.0-4.6)  x10^3/uL


 


Absolute Monos (auto)     (0.0-1.3)  x10^3/uL


 


Absolute Nucleated RBC     (0.00-0.01)  x10^3u/L


 


Lymphocytes %     (24.0-44.0)  %


 


Monocytes %     (0.0-12.0)  %


 


Eosinophils %     (0.00-5.0)  %


 


Basophils %     (0.0-0.4)  %


 


Absolute Granulocytes     (1.4-6.9)  x10^3/uL


 


Basophils #     (0-0.4)  x10^3/uL


 


Sodium    139  (137-145)  mmol/L


 


Potassium    4.2  (3.5-5.1)  mmol/L


 


Chloride    105  ()  mmol/L


 


Carbon Dioxide    25  (22-30)  mmol/L


 


Anion Gap    12.7  (5-15)  MEQ/L


 


BUN    25 H  (9-20)  mg/dL


 


Creatinine    1.77 H  (0.66-1.25)  mg/dL


 


Estimated GFR    39.5  ML/MIN


 


Glucose    53 L  ()  mg/dL


 


Calcium    8.9  (8.4-10.2)  mg/dL


 


Total Bilirubin    0.60  (0.2-1.3)  mg/dL


 


AST    26  (17-59)  U/L


 


ALT    15  (0-50)  U/L


 


Alkaline Phosphatase    71  ()  U/L


 


Troponin I   0.013   (0.000-0.034)  ng/mL


 


NT-Pro-B Natriuret Pep    1180  (0-1800)  pg/mL


 


Serum Total Protein    8.7 H  (6.3-8.2)  g/dL


 


Albumin    4.5  (3.5-5.0)  g/dL


 


Influenza Type A Ag  NEGATIVE    (NEGATIVE)  


 


Influenza Type B Ag  NEGATIVE    (NEGATIVE)  


 


RSV (PCR)  NEGATIVE    (Negative)  


 


SARS-CoV-2 (PCR)  POSITIVE A    (NEGATIVE)  














  12/05/22 Range/Units





  18:10 


 


WBC  8.8  (4.0-10.5)  x10^3/uL


 


RBC  3.84 L  (4.1-5.6)  x10^6/uL


 


Hgb  12.0 L  (12.5-18.0)  g/dL


 


Hct  37.2 L  (42-50)  %


 


MCV  96.9  ()  fL


 


MCH  31.3  (26-32)  pg


 


MCHC  32.3  (32-36)  g/dL


 


RDW  12.8  (11.5-14.0)  %


 


Plt Count  209  (150-450)  x10^3/uL


 


MPV  9.9  (7.5-11.0)  fL


 


Gran %  73.8 H  (36.0-66.0)  %


 


Immature Gran % (Auto)  0.3  (0.00-0.4)  %


 


Nucleat RBC Rel Count  0.0  (0.00-0.1)  %


 


Eos # (Auto)  0.02  (0-0.5)  x10^3/uL


 


Immature Gran # (Auto)  0.03  (0.00-0.03)  x10^3u/L


 


Absolute Lymphs (auto)  1.35  (1.0-4.6)  x10^3/uL


 


Absolute Monos (auto)  0.83  (0.0-1.3)  x10^3/uL


 


Absolute Nucleated RBC  0.00  (0.00-0.01)  x10^3u/L


 


Lymphocytes %  15.4 L  (24.0-44.0)  %


 


Monocytes %  9.5  (0.0-12.0)  %


 


Eosinophils %  0.2  (0.00-5.0)  %


 


Basophils %  0.8  (0.0-0.4)  %


 


Absolute Granulocytes  6.47  (1.4-6.9)  x10^3/uL


 


Basophils #  0.07  (0-0.4)  x10^3/uL


 


Sodium   (137-145)  mmol/L


 


Potassium   (3.5-5.1)  mmol/L


 


Chloride   ()  mmol/L


 


Carbon Dioxide   (22-30)  mmol/L


 


Anion Gap   (5-15)  MEQ/L


 


BUN   (9-20)  mg/dL


 


Creatinine   (0.66-1.25)  mg/dL


 


Estimated GFR   ML/MIN


 


Glucose   ()  mg/dL


 


Calcium   (8.4-10.2)  mg/dL


 


Total Bilirubin   (0.2-1.3)  mg/dL


 


AST   (17-59)  U/L


 


ALT   (0-50)  U/L


 


Alkaline Phosphatase   ()  U/L


 


Troponin I   (0.000-0.034)  ng/mL


 


NT-Pro-B Natriuret Pep   (0-1800)  pg/mL


 


Serum Total Protein   (6.3-8.2)  g/dL


 


Albumin   (3.5-5.0)  g/dL


 


Influenza Type A Ag   (NEGATIVE)  


 


Influenza Type B Ag   (NEGATIVE)  


 


RSV (PCR)   (Negative)  


 


SARS-CoV-2 (PCR)   (NEGATIVE)  














- Progress


Progress: improved


Progress Note: 


Case discussed with Dr. Loredo who accepts admission to observation.  Patient 

is COVID-positive.  He patient appears dehydrated.  Patient states he is too 

weak to go home.  Patient is a fall risk.  Plan of care discussed with patient. 

He agrees to admission [facility] for further evaluation

and treatment.





Portions of this note were created with voice recognition technology.  There may

be grammatical, spelling, punctuation or sound alike errors


12/05/22 22:25





Counseled pt/family regarding: lab results, diagnosis, rad results





- Departure


Departure Disposition: Home


Clinical Impression: 


 COVID-19, General weakness, Dehydration





Condition: Stable


Critical Care Time: No


Referrals: 


TABBY RIVAS MD [Primary Care Provider] - Follow up/PCP as directed

## 2022-12-06 LAB
ALBUMIN SERPL-MCNC: 3.8 G/DL (ref 3.5–5)
ALP SERPL-CCNC: 70 U/L (ref 38–126)
ALT SERPL-CCNC: 36 U/L (ref 0–50)
ANION GAP SERPL CALC-SCNC: 13.1 MEQ/L (ref 5–15)
AST SERPL QL: 79 U/L (ref 17–59)
BASOPHILS # BLD AUTO: 0.05 X10^3/UL (ref 0–0.4)
BILIRUB BLD-MCNC: 0.5 MG/DL (ref 0.2–1.3)
BUN SERPL-MCNC: 26 MG/DL (ref 9–20)
CALCIUM SPEC-MCNC: 8 MG/DL (ref 8.4–10.2)
CHLORIDE SERPL-SCNC: 105 MMOL/L (ref 98–107)
CO2 SERPL-SCNC: 23 MMOL/L (ref 22–30)
CREAT SERPL-MCNC: 1.58 MG/DL (ref 0.66–1.25)
EOSINOPHIL # BLD AUTO: 0 X10^3/UL (ref 0–0.5)
GFR SERPLBLD BASED ON 1.73 SQ M-ARVRAT: 45.1 ML/MIN
GLUCOSE SERPL-MCNC: 44 MG/DL (ref 74–106)
GLUCOSE UR-MCNC: 100 MG/DL
HCT VFR BLD AUTO: 36.2 % (ref 42–50)
HGB BLD-MCNC: 11.3 G/DL (ref 12.5–18)
LYMPHOCYTES # SPEC AUTO: 0.71 X10^3/UL (ref 1–4.6)
MCH RBC QN AUTO: 31.2 PG (ref 26–32)
MCHC RBC AUTO-ENTMCNC: 31.2 G/DL (ref 32–36)
MONOCYTES # BLD AUTO: 0.69 X10^3/UL (ref 0–1.3)
PLATELET # BLD AUTO: 168 X10^3/UL (ref 150–450)
POTASSIUM SERPLBLD-SCNC: 4.7 MMOL/L (ref 3.5–5.1)
PROT SERPL-MCNC: 7.2 G/DL (ref 6.3–8.2)
PROT UR STRIP-MCNC: 30 MG/DL
RBC # BLD AUTO: 3.62 X10^6/UL (ref 4.1–5.6)
RBC # UR AUTO: NEGATIVE ERY/UL (ref 0–5)
RBC #/AREA URNS HPF: (no result) /HPF (ref 0–2)
SODIUM SERPL-SCNC: 136 MMOL/L (ref 137–145)
UA DIPSTICK PNL UR: (no result)
URINE CULTURED INDICATED?: NO
WBC # BLD AUTO: 6.4 X10^3/UL (ref 4–10.5)
WBC #/AREA URNS HPF: (no result) /HPF (ref 0–5)

## 2022-12-06 RX ADMIN — SIMVASTATIN SCH MG: 20 TABLET, FILM COATED ORAL at 10:37

## 2022-12-06 RX ADMIN — RIVAROXABAN SCH MG: 10 TABLET, FILM COATED ORAL at 10:37

## 2022-12-06 RX ADMIN — DEXTROSE AND SODIUM CHLORIDE SCH MLS/HR: 5; 900 INJECTION, SOLUTION INTRAVENOUS at 09:47

## 2022-12-06 RX ADMIN — SACUBITRIL AND VALSARTAN SCH TABLET: 49; 51 TABLET, FILM COATED ORAL at 22:39

## 2022-12-06 RX ADMIN — CEFEPIME HYDROCHLORIDE SCH MLS/HR: 2 INJECTION, POWDER, FOR SOLUTION INTRAVENOUS at 22:41

## 2022-12-06 RX ADMIN — DOCUSATE SODIUM SCH: 100 CAPSULE, LIQUID FILLED ORAL at 11:34

## 2022-12-06 RX ADMIN — INSULIN LISPRO PRN UNIT: 100 INJECTION, SOLUTION INTRAVENOUS; SUBCUTANEOUS at 22:40

## 2022-12-06 RX ADMIN — DEXTROSE AND SODIUM CHLORIDE SCH MLS/HR: 5; 900 INJECTION, SOLUTION INTRAVENOUS at 20:09

## 2022-12-06 RX ADMIN — SACUBITRIL AND VALSARTAN SCH TABLET: 49; 51 TABLET, FILM COATED ORAL at 10:37

## 2022-12-06 RX ADMIN — METOPROLOL SUCCINATE SCH MG: 50 TABLET, EXTENDED RELEASE ORAL at 10:37

## 2022-12-06 RX ADMIN — CLOPIDOGREL BISULFATE SCH MG: 75 TABLET ORAL at 10:37

## 2022-12-06 RX ADMIN — DEXAMETHASONE SODIUM PHOSPHATE SCH MG: 10 INJECTION INTRAMUSCULAR; INTRAVENOUS at 10:36

## 2022-12-06 RX ADMIN — MAGNESIUM OXIDE TAB 400 MG (241.3 MG ELEMENTAL MG) SCH MG: 400 (241.3 MG) TAB at 10:37

## 2022-12-06 NOTE — PCM.HP
History of Present Illness





- Chief Complaint


Chief Complaint: COVID 19


History of Present Illness: 


 is a 82 year old male pt of Dr. Jay' seen by me this morning at 

approx 0745.  He has a hx CAD (hx CABG and MI), Renal failure, DM II, CHF, 

hyperlipidemia, ANEL, oA, and HTN.  He told ER staff he had 3d of cough, feeling 

tired and weak and was found to have Covid.  His CXR is nonacute.  WBC 8.8 on 

admission, 6.4 this morning.  eGFR 45.1 today, 39.5 on admission.  Blood sugar 

was 44 this morning.





He tells me that he just felt sick yesterday morning, couldn't get up, and his 

visitor called the ambulance.  Denies fever.  Does admit to cough x 4d.  Had D 

last p.m. no vomiting.





This morning he is lying on his L side in bed, bed is flat. He is AAO x3.





- Review of Systems


Constitutional: Weakness


Respiratory: Cough


Abdominal/Gastrointestinal: Abdominal Pain (lower abd, with cough), Diarrhea


All Other Systems: Reviewed and Negative





Medications & Allergies


Home Medications: 


                              Home Medication List





Clopidogrel Bisulfate [PLAVIX Tablet] 75 mg PO DAILY 06/12/19 [History Confirmed

 12/06/22]


Docusate Sodium 100 mg*** [Docusate Sodium 100 MG***] 100 mg PO DAILY 06/12/19 

[History Confirmed 12/06/22]


Magnesium Oxide 400 mg*** [Mag-Ox 400***] 400 mg PO DAILY 06/12/19 [History 

Confirmed 12/06/22]


Metoprolol Succinate 50 mg*** [Toprol Xl 50 MG***] 50 mg PO DAILY 06/12/19 

[History Confirmed 12/06/22]


Pravastatin Sodium [Pravachol] 40 mg PO HS 06/12/19 [History Confirmed 12/06/22]


Rivaroxaban [Xarelto] 15 mg PO DAILY 06/12/19 [History Confirmed 12/06/22]


Sacubitril/Valsartan [Entresto 49 mg-51 mg Tablet] 1 each PO BID 06/12/19 

[History Confirmed 12/06/22]


Insulin Glargine,Hum.rec.anlog [Lantus] 100 unit SQ LUNCH 06/20/19 [History 

Confirmed 12/06/22]


Insulin Lispro [Humalog] 100 unit SQ DAILY PRN 06/20/19 [History Confirmed 

12/06/22]








Allergies/Adverse Reactions: 


                                    Allergies











Allergy/AdvReac Type Severity Reaction Status Date / Time


 


No Known Drug Allergies Allergy   Verified 12/05/22 18:10














- Past Medical History


Past Medical History: Yes


Neurological History: Stroke


ENT History: Cataracts


Cardiac History: Congestive Heart Failure, Coronary Artery Disease, High 

Cholesterol, Hypertension, Myocardial Infarction (MI), Other


Respiratory History: Sleep Apnea


Endocrine Medical History: Diabetes Type II


Musculoskelatal History: Arthritis


GI Medical History: No Pertinent History


 History: No Pertinent History


Pyscho-Social History: No Pertinent History


Male Reproductive Disorders: No Pertinent History





- Past Surgical History


Past Surgical History: Yes


Neuro Surgical History: No Pertinent History


Cardiac History: CABG, Pacemaker


Respiratory Surgery: No Pertinent History


GI Surgical History: No Pertinent History


Genitourinary Surgical Hx: No Pertinent History


Musculskeletal Surgical Hx: Other


Male Surgical History: No Pertinent History


Other Surgical History: right knee,  right shoulder surgery.cancer removed on 

lip and colonscopy in past





- Social History


Smoking Status: Former smoker


Exposure to second hand smoke: No


Alcohol: None


Drug Use: none





- Physical Exam


Vital Signs: 


                               Vital Signs - 24 hr











  Temp Pulse Resp BP Pulse Ox


 


 12/06/22 16:00  98.2 F  65  14  133/83  97


 


 12/06/22 14:04      92 L


 


 12/06/22 12:00  97.8 F  68  17  115/74  95


 


 12/06/22 08:00  98.4 F  73  18  171/68  95


 


 12/06/22 06:00    18  


 


 12/06/22 04:00  97.2 F  68  18  145/72  96


 


 12/06/22 03:48    18  


 


 12/06/22 02:35      91 L


 


 12/06/22 02:02  97.1 F  64  18  125/60  96


 


 12/06/22 00:37   61  18  135/49  98


 


 12/05/22 23:00   90  18   94 L


 


 12/05/22 22:39      89 L


 


 12/05/22 22:00   94 H  18  137/55  96


 


 12/05/22 20:53   89  18   97


 


 12/05/22 19:29   63  23  123/88  96











General Appearance: no apparent distress, alert, other (lying quietly on his L 

side)


Neurologic Exam: oriented x 3, cooperative


Eye Exam: eyes nml inspection


Ears, Nose, Throat Exam: moist mucous membranes


Neck Exam: normal inspection


Respiratory Exam: normal breath sounds, wheezing (faint wheeze), No 

crackles/rales, No rhonchi


Cardiovascular Exam: regular rate/rhythm, normal heart sounds, No murmur


Gastrointestinal/Abdomen Exam: soft, normal bowel sounds, tenderness (grimaces 

as I palpate his abdomen but denies pain)





Results





- Labs


Lab/Micro Results: 


                            Lab Results-Last 24 Hours











  12/05/22 12/05/22 12/05/22 Range/Units





  18:10 18:10 18:10 


 


WBC  8.8    (4.0-10.5)  x10^3/uL


 


RBC  3.84 L    (4.1-5.6)  x10^6/uL


 


Hgb  12.0 L    (12.5-18.0)  g/dL


 


Hct  37.2 L    (42-50)  %


 


MCV  96.9    ()  fL


 


MCH  31.3    (26-32)  pg


 


MCHC  32.3    (32-36)  g/dL


 


RDW  12.8    (11.5-14.0)  %


 


Plt Count  209    (150-450)  x10^3/uL


 


MPV  9.9    (7.5-11.0)  fL


 


Gran %  73.8 H    (36.0-66.0)  %


 


Immature Gran % (Auto)  0.3    (0.00-0.4)  %


 


Nucleat RBC Rel Count  0.0    (0.00-0.1)  %


 


Eos # (Auto)  0.02    (0-0.5)  x10^3/uL


 


Immature Gran # (Auto)  0.03    (0.00-0.03)  x10^3u/L


 


Absolute Lymphs (auto)  1.35    (1.0-4.6)  x10^3/uL


 


Absolute Monos (auto)  0.83    (0.0-1.3)  x10^3/uL


 


Absolute Nucleated RBC  0.00    (0.00-0.01)  x10^3u/L


 


Lymphocytes %  15.4 L    (24.0-44.0)  %


 


Monocytes %  9.5    (0.0-12.0)  %


 


Eosinophils %  0.2    (0.00-5.0)  %


 


Basophils %  0.8    (0.0-0.4)  %


 


Absolute Granulocytes  6.47    (1.4-6.9)  x10^3/uL


 


Basophils #  0.07    (0-0.4)  x10^3/uL


 


D-Dimer     (0.0-0.50)  mg/L


 


Sodium   139   (137-145)  mmol/L


 


Potassium   4.2   (3.5-5.1)  mmol/L


 


Chloride   105   ()  mmol/L


 


Carbon Dioxide   25   (22-30)  mmol/L


 


Anion Gap   12.7   (5-15)  MEQ/L


 


BUN   25 H   (9-20)  mg/dL


 


Creatinine   1.77 H   (0.66-1.25)  mg/dL


 


Estimated GFR   39.5   ML/MIN


 


Glucose   53 L   ()  mg/dL


 


POC Glucometer     (74 to 106)  mg/dL


 


Hemoglobin A1c     (4.5-6.0)  %


 


Calcium   8.9   (8.4-10.2)  mg/dL


 


Total Bilirubin   0.60   (0.2-1.3)  mg/dL


 


AST   26   (17-59)  U/L


 


ALT   15   (0-50)  U/L


 


Alkaline Phosphatase   71   ()  U/L


 


Troponin I    0.013  (0.000-0.034)  ng/mL


 


NT-Pro-B Natriuret Pep   1180   (0-1800)  pg/mL


 


Serum Total Protein   8.7 H   (6.3-8.2)  g/dL


 


Albumin   4.5   (3.5-5.0)  g/dL


 


Influenza Type A Ag     (NEGATIVE)  


 


Influenza Type B Ag     (NEGATIVE)  


 


RSV (PCR)     (Negative)  


 


SARS-CoV-2 (PCR)     (NEGATIVE)  














  12/05/22 12/05/22 12/05/22 Range/Units





  18:19 23:25 Unknown 


 


WBC     (4.0-10.5)  x10^3/uL


 


RBC     (4.1-5.6)  x10^6/uL


 


Hgb     (12.5-18.0)  g/dL


 


Hct     (42-50)  %


 


MCV     ()  fL


 


MCH     (26-32)  pg


 


MCHC     (32-36)  g/dL


 


RDW     (11.5-14.0)  %


 


Plt Count     (150-450)  x10^3/uL


 


MPV     (7.5-11.0)  fL


 


Gran %     (36.0-66.0)  %


 


Immature Gran % (Auto)     (0.00-0.4)  %


 


Nucleat RBC Rel Count     (0.00-0.1)  %


 


Eos # (Auto)     (0-0.5)  x10^3/uL


 


Immature Gran # (Auto)     (0.00-0.03)  x10^3u/L


 


Absolute Lymphs (auto)     (1.0-4.6)  x10^3/uL


 


Absolute Monos (auto)     (0.0-1.3)  x10^3/uL


 


Absolute Nucleated RBC     (0.00-0.01)  x10^3u/L


 


Lymphocytes %     (24.0-44.0)  %


 


Monocytes %     (0.0-12.0)  %


 


Eosinophils %     (0.00-5.0)  %


 


Basophils %     (0.0-0.4)  %


 


Absolute Granulocytes     (1.4-6.9)  x10^3/uL


 


Basophils #     (0-0.4)  x10^3/uL


 


D-Dimer    0.51 H  (0.0-0.50)  mg/L


 


Sodium     (137-145)  mmol/L


 


Potassium     (3.5-5.1)  mmol/L


 


Chloride     ()  mmol/L


 


Carbon Dioxide     (22-30)  mmol/L


 


Anion Gap     (5-15)  MEQ/L


 


BUN     (9-20)  mg/dL


 


Creatinine     (0.66-1.25)  mg/dL


 


Estimated GFR     ML/MIN


 


Glucose     ()  mg/dL


 


POC Glucometer     (74 to 106)  mg/dL


 


Hemoglobin A1c     (4.5-6.0)  %


 


Calcium     (8.4-10.2)  mg/dL


 


Total Bilirubin     (0.2-1.3)  mg/dL


 


AST     (17-59)  U/L


 


ALT     (0-50)  U/L


 


Alkaline Phosphatase     ()  U/L


 


Troponin I   0.019   (0.000-0.034)  ng/mL


 


NT-Pro-B Natriuret Pep     (0-1800)  pg/mL


 


Serum Total Protein     (6.3-8.2)  g/dL


 


Albumin     (3.5-5.0)  g/dL


 


Influenza Type A Ag  NEGATIVE    (NEGATIVE)  


 


Influenza Type B Ag  NEGATIVE    (NEGATIVE)  


 


RSV (PCR)  NEGATIVE    (Negative)  


 


SARS-CoV-2 (PCR)  POSITIVE A    (NEGATIVE)  














  12/06/22 12/06/22 12/06/22 Range/Units





  03:00 03:00 03:00 


 


WBC   6.4   (4.0-10.5)  x10^3/uL


 


RBC   3.62 L   (4.1-5.6)  x10^6/uL


 


Hgb   11.3 L   (12.5-18.0)  g/dL


 


Hct   36.2 L   (42-50)  %


 


MCV   100.0   ()  fL


 


MCH   31.2   (26-32)  pg


 


MCHC   31.2 L   (32-36)  g/dL


 


RDW   12.9   (11.5-14.0)  %


 


Plt Count   168   (150-450)  x10^3/uL


 


MPV   9.5   (7.5-11.0)  fL


 


Gran %   77.1 H   (36.0-66.0)  %


 


Immature Gran % (Auto)   0.2   (0.00-0.4)  %


 


Nucleat RBC Rel Count   0.0   (0.00-0.1)  %


 


Eos # (Auto)   0   (0-0.5)  x10^3/uL


 


Immature Gran # (Auto)   0.01   (0.00-0.03)  x10^3u/L


 


Absolute Lymphs (auto)   0.71 L   (1.0-4.6)  x10^3/uL


 


Absolute Monos (auto)   0.69   (0.0-1.3)  x10^3/uL


 


Absolute Nucleated RBC   0.00   (0.00-0.01)  x10^3u/L


 


Lymphocytes %   11.1 L   (24.0-44.0)  %


 


Monocytes %   10.8   (0.0-12.0)  %


 


Eosinophils %   0.0   (0.00-5.0)  %


 


Basophils %   0.8   (0.0-0.4)  %


 


Absolute Granulocytes   4.92   (1.4-6.9)  x10^3/uL


 


Basophils #   0.05   (0-0.4)  x10^3/uL


 


D-Dimer     (0.0-0.50)  mg/L


 


Sodium    136 L  (137-145)  mmol/L


 


Potassium    4.7  (3.5-5.1)  mmol/L


 


Chloride    105  ()  mmol/L


 


Carbon Dioxide    23  (22-30)  mmol/L


 


Anion Gap    13.1  (5-15)  MEQ/L


 


BUN    26 H  (9-20)  mg/dL


 


Creatinine    1.58 H  (0.66-1.25)  mg/dL


 


Estimated GFR    45.1  ML/MIN


 


Glucose    44 L*  ()  mg/dL


 


POC Glucometer     (74 to 106)  mg/dL


 


Hemoglobin A1c     (4.5-6.0)  %


 


Calcium    8.0 L  (8.4-10.2)  mg/dL


 


Total Bilirubin    0.50  (0.2-1.3)  mg/dL


 


AST    79 H  (17-59)  U/L


 


ALT    36  (0-50)  U/L


 


Alkaline Phosphatase    70  ()  U/L


 


Troponin I  0.014    (0.000-0.034)  ng/mL


 


NT-Pro-B Natriuret Pep     (0-1800)  pg/mL


 


Serum Total Protein    7.2  (6.3-8.2)  g/dL


 


Albumin    3.8  (3.5-5.0)  g/dL


 


Influenza Type A Ag     (NEGATIVE)  


 


Influenza Type B Ag     (NEGATIVE)  


 


RSV (PCR)     (Negative)  


 


SARS-CoV-2 (PCR)     (NEGATIVE)  














  12/06/22 12/06/22 12/06/22 Range/Units





  04:42 05:16 06:00 


 


WBC     (4.0-10.5)  x10^3/uL


 


RBC     (4.1-5.6)  x10^6/uL


 


Hgb     (12.5-18.0)  g/dL


 


Hct     (42-50)  %


 


MCV     ()  fL


 


MCH     (26-32)  pg


 


MCHC     (32-36)  g/dL


 


RDW     (11.5-14.0)  %


 


Plt Count     (150-450)  x10^3/uL


 


MPV     (7.5-11.0)  fL


 


Gran %     (36.0-66.0)  %


 


Immature Gran % (Auto)     (0.00-0.4)  %


 


Nucleat RBC Rel Count     (0.00-0.1)  %


 


Eos # (Auto)     (0-0.5)  x10^3/uL


 


Immature Gran # (Auto)     (0.00-0.03)  x10^3u/L


 


Absolute Lymphs (auto)     (1.0-4.6)  x10^3/uL


 


Absolute Monos (auto)     (0.0-1.3)  x10^3/uL


 


Absolute Nucleated RBC     (0.00-0.01)  x10^3u/L


 


Lymphocytes %     (24.0-44.0)  %


 


Monocytes %     (0.0-12.0)  %


 


Eosinophils %     (0.00-5.0)  %


 


Basophils %     (0.0-0.4)  %


 


Absolute Granulocytes     (1.4-6.9)  x10^3/uL


 


Basophils #     (0-0.4)  x10^3/uL


 


D-Dimer     (0.0-0.50)  mg/L


 


Sodium     (137-145)  mmol/L


 


Potassium     (3.5-5.1)  mmol/L


 


Chloride     ()  mmol/L


 


Carbon Dioxide     (22-30)  mmol/L


 


Anion Gap     (5-15)  MEQ/L


 


BUN     (9-20)  mg/dL


 


Creatinine     (0.66-1.25)  mg/dL


 


Estimated GFR     ML/MIN


 


Glucose     ()  mg/dL


 


POC Glucometer  57 L  62 L  84  (74 to 106)  mg/dL


 


Hemoglobin A1c     (4.5-6.0)  %


 


Calcium     (8.4-10.2)  mg/dL


 


Total Bilirubin     (0.2-1.3)  mg/dL


 


AST     (17-59)  U/L


 


ALT     (0-50)  U/L


 


Alkaline Phosphatase     ()  U/L


 


Troponin I     (0.000-0.034)  ng/mL


 


NT-Pro-B Natriuret Pep     (0-1800)  pg/mL


 


Serum Total Protein     (6.3-8.2)  g/dL


 


Albumin     (3.5-5.0)  g/dL


 


Influenza Type A Ag     (NEGATIVE)  


 


Influenza Type B Ag     (NEGATIVE)  


 


RSV (PCR)     (Negative)  


 


SARS-CoV-2 (PCR)     (NEGATIVE)  














  12/06/22 12/06/22 12/06/22 Range/Units





  07:32 10:42 11:46 


 


WBC     (4.0-10.5)  x10^3/uL


 


RBC     (4.1-5.6)  x10^6/uL


 


Hgb     (12.5-18.0)  g/dL


 


Hct     (42-50)  %


 


MCV     ()  fL


 


MCH     (26-32)  pg


 


MCHC     (32-36)  g/dL


 


RDW     (11.5-14.0)  %


 


Plt Count     (150-450)  x10^3/uL


 


MPV     (7.5-11.0)  fL


 


Gran %     (36.0-66.0)  %


 


Immature Gran % (Auto)     (0.00-0.4)  %


 


Nucleat RBC Rel Count     (0.00-0.1)  %


 


Eos # (Auto)     (0-0.5)  x10^3/uL


 


Immature Gran # (Auto)     (0.00-0.03)  x10^3u/L


 


Absolute Lymphs (auto)     (1.0-4.6)  x10^3/uL


 


Absolute Monos (auto)     (0.0-1.3)  x10^3/uL


 


Absolute Nucleated RBC     (0.00-0.01)  x10^3u/L


 


Lymphocytes %     (24.0-44.0)  %


 


Monocytes %     (0.0-12.0)  %


 


Eosinophils %     (0.00-5.0)  %


 


Basophils %     (0.0-0.4)  %


 


Absolute Granulocytes     (1.4-6.9)  x10^3/uL


 


Basophils #     (0-0.4)  x10^3/uL


 


D-Dimer     (0.0-0.50)  mg/L


 


Sodium     (137-145)  mmol/L


 


Potassium     (3.5-5.1)  mmol/L


 


Chloride     ()  mmol/L


 


Carbon Dioxide     (22-30)  mmol/L


 


Anion Gap     (5-15)  MEQ/L


 


BUN     (9-20)  mg/dL


 


Creatinine     (0.66-1.25)  mg/dL


 


Estimated GFR     ML/MIN


 


Glucose     ()  mg/dL


 


POC Glucometer  93   90  (74 to 106)  mg/dL


 


Hemoglobin A1c   7.64 H   (4.5-6.0)  %


 


Calcium     (8.4-10.2)  mg/dL


 


Total Bilirubin     (0.2-1.3)  mg/dL


 


AST     (17-59)  U/L


 


ALT     (0-50)  U/L


 


Alkaline Phosphatase     ()  U/L


 


Troponin I     (0.000-0.034)  ng/mL


 


NT-Pro-B Natriuret Pep     (0-1800)  pg/mL


 


Serum Total Protein     (6.3-8.2)  g/dL


 


Albumin     (3.5-5.0)  g/dL


 


Influenza Type A Ag     (NEGATIVE)  


 


Influenza Type B Ag     (NEGATIVE)  


 


RSV (PCR)     (Negative)  


 


SARS-CoV-2 (PCR)     (NEGATIVE)  














  12/06/22 Range/Units





  17:10 


 


WBC   (4.0-10.5)  x10^3/uL


 


RBC   (4.1-5.6)  x10^6/uL


 


Hgb   (12.5-18.0)  g/dL


 


Hct   (42-50)  %


 


MCV   ()  fL


 


MCH   (26-32)  pg


 


MCHC   (32-36)  g/dL


 


RDW   (11.5-14.0)  %


 


Plt Count   (150-450)  x10^3/uL


 


MPV   (7.5-11.0)  fL


 


Gran %   (36.0-66.0)  %


 


Immature Gran % (Auto)   (0.00-0.4)  %


 


Nucleat RBC Rel Count   (0.00-0.1)  %


 


Eos # (Auto)   (0-0.5)  x10^3/uL


 


Immature Gran # (Auto)   (0.00-0.03)  x10^3u/L


 


Absolute Lymphs (auto)   (1.0-4.6)  x10^3/uL


 


Absolute Monos (auto)   (0.0-1.3)  x10^3/uL


 


Absolute Nucleated RBC   (0.00-0.01)  x10^3u/L


 


Lymphocytes %   (24.0-44.0)  %


 


Monocytes %   (0.0-12.0)  %


 


Eosinophils %   (0.00-5.0)  %


 


Basophils %   (0.0-0.4)  %


 


Absolute Granulocytes   (1.4-6.9)  x10^3/uL


 


Basophils #   (0-0.4)  x10^3/uL


 


D-Dimer   (0.0-0.50)  mg/L


 


Sodium   (137-145)  mmol/L


 


Potassium   (3.5-5.1)  mmol/L


 


Chloride   ()  mmol/L


 


Carbon Dioxide   (22-30)  mmol/L


 


Anion Gap   (5-15)  MEQ/L


 


BUN   (9-20)  mg/dL


 


Creatinine   (0.66-1.25)  mg/dL


 


Estimated GFR   ML/MIN


 


Glucose   ()  mg/dL


 


POC Glucometer  355 H  (74 to 106)  mg/dL


 


Hemoglobin A1c   (4.5-6.0)  %


 


Calcium   (8.4-10.2)  mg/dL


 


Total Bilirubin   (0.2-1.3)  mg/dL


 


AST   (17-59)  U/L


 


ALT   (0-50)  U/L


 


Alkaline Phosphatase   ()  U/L


 


Troponin I   (0.000-0.034)  ng/mL


 


NT-Pro-B Natriuret Pep   (0-1800)  pg/mL


 


Serum Total Protein   (6.3-8.2)  g/dL


 


Albumin   (3.5-5.0)  g/dL


 


Influenza Type A Ag   (NEGATIVE)  


 


Influenza Type B Ag   (NEGATIVE)  


 


RSV (PCR)   (Negative)  


 


SARS-CoV-2 (PCR)   (NEGATIVE)  








                                   Accuchecks











Date                           12/06/22


 


Date                           12/06/22


 


Date                           12/06/22


 


Time                           17:31


 


Time                           12:35


 


Time                           08:35

















- Radiology Impressions


Radiology Exams & Impressions: 


                              Radiology Procedures











 Category Date Time Status


 


 CHEST 1 VIEW (PORTABLE) Stat Exams  12/05/22 20:06 Completed














- Other Procedures and Tests


                               Respiratory Therapy





12/06/22 02:34


Oxygen Nasal Cannula 2 lpm 














Assessment/Plan


(1) COVID-19


Current Visit: Yes   Status: Acute   


Assessment & Plan: 


He had a dose of remdesivir in ER, apparently; restarted today.  Started 

dexamethasone.  He is on xarelto for his heart valve.


Code(s): U07.1 - COVID-19   





(2) Dehydration


Current Visit: Yes   Status: Acute   Code(s): E86.0 - DEHYDRATION   





(3) General weakness


Current Visit: Yes   Status: Acute   Code(s): R53.1 - WEAKNESS   





(4) Coronary artery disease


Current Visit: No   Status: Chronic   


Qualifiers: 


   Coronary Disease-Associated Artery/Lesion type: bypass graft   Native vs. 

transplanted heart: native heart   Associated angina: with stable angina   

Qualified Code(s): I25.708 - Atherosclerosis of coronary artery bypass graft(s),

unspecified, with other forms of angina pectoris   


Assessment & Plan: 


On plavix.


Code(s): I25.10 - ATHSCL HEART DISEASE OF NATIVE CORONARY ARTERY W/O ANG PCTRS  







(5) artificial aortic valve


Current Visit: No   Status: Chronic   





(6) Diabetes mellitus


Current Visit: No   Status: Chronic   


Qualifiers: 


   Diabetes mellitus type: type 2   Diabetes mellitus long term insulin use: wit

h long term use   Diabetes mellitus complication status: with circulatory 

complication   Diabetes mellitus complication detail: with peripheral angiopathy

without gangrene   Qualified Code(s): E11.51 - Type 2 diabetes mellitus with 

diabetic peripheral angiopathy without gangrene; Z79.4 - Long term (current) use

of insulin   


Assessment & Plan: 


with hypoglycemia this morning; I suspect he has not been eating well.  Holding 

lantus; ok to cover with sliding scale.


Code(s): E11.9 - TYPE 2 DIABETES MELLITUS WITHOUT COMPLICATIONS

## 2022-12-06 NOTE — XRAY
Indication: Cough and short of breath.



Comparison: August 15, 2020



Portable apical lordotic chest inflated without focal infiltrate,

consolidation, or large effusion.  Heart not enlarged again with CABG, cardiac

valve replacement, and left pacemaker.  Bony thorax intact again with mild

osteopenia and degenerative changes.



Impression: Continued nonacute chest with chronic features.

## 2022-12-07 RX ADMIN — SACUBITRIL AND VALSARTAN SCH TABLET: 49; 51 TABLET, FILM COATED ORAL at 11:01

## 2022-12-07 RX ADMIN — SIMVASTATIN SCH MG: 20 TABLET, FILM COATED ORAL at 11:01

## 2022-12-07 RX ADMIN — METOPROLOL SUCCINATE SCH MG: 50 TABLET, EXTENDED RELEASE ORAL at 10:59

## 2022-12-07 RX ADMIN — CLOPIDOGREL BISULFATE SCH MG: 75 TABLET ORAL at 10:59

## 2022-12-07 RX ADMIN — SACUBITRIL AND VALSARTAN SCH TABLET: 49; 51 TABLET, FILM COATED ORAL at 22:32

## 2022-12-07 RX ADMIN — DOCUSATE SODIUM SCH MG: 100 CAPSULE, LIQUID FILLED ORAL at 10:59

## 2022-12-07 RX ADMIN — RIVAROXABAN SCH MG: 10 TABLET, FILM COATED ORAL at 10:59

## 2022-12-07 RX ADMIN — DEXAMETHASONE SODIUM PHOSPHATE SCH MG: 10 INJECTION INTRAMUSCULAR; INTRAVENOUS at 10:59

## 2022-12-07 RX ADMIN — CEFEPIME HYDROCHLORIDE SCH MLS/HR: 2 INJECTION, POWDER, FOR SOLUTION INTRAVENOUS at 22:33

## 2022-12-07 RX ADMIN — INSULIN LISPRO PRN UNIT: 100 INJECTION, SOLUTION INTRAVENOUS; SUBCUTANEOUS at 12:48

## 2022-12-07 RX ADMIN — INSULIN LISPRO PRN UNIT: 100 INJECTION, SOLUTION INTRAVENOUS; SUBCUTANEOUS at 22:33

## 2022-12-07 RX ADMIN — MAGNESIUM OXIDE TAB 400 MG (241.3 MG ELEMENTAL MG) SCH MG: 400 (241.3 MG) TAB at 10:59

## 2022-12-07 RX ADMIN — INSULIN LISPRO PRN UNIT: 100 INJECTION, SOLUTION INTRAVENOUS; SUBCUTANEOUS at 17:07

## 2022-12-07 NOTE — PCM.NOTE
Date and Time: 12/07/22 0931





Subjective Assessment: 





patient denies shortness of breath, he is tolerating po. currently requiring low

level oxygen via NC. 





Objective Exam


General Appearance: no apparent distress, obese


Neurologic Exam: alert, oriented x 3, cooperative


Respiratory Exam: rhonchi


Cardiovascular Exam: regular rate/rhythm, normal heart sounds


Gastrointestinal/Abdomen Exam: soft, No tenderness, No mass


Extremity Exam: normal inspection, normal range of motion





OBJECTIVE DATA


Vital Signs: 


                               Vital Signs - 24 hr











  Temp Pulse Resp BP Pulse Ox


 


 12/07/22 07:35  97.7 F  62  17  123/56  92 L


 


 12/07/22 07:30      92 L


 


 12/07/22 06:00    20  


 


 12/07/22 04:00  97.7 F  67  20  131/55  100


 


 12/07/22 01:45    22  


 


 12/07/22 00:00  98.8 F  60  20  129/58  93 L


 


 12/06/22 22:00    22  


 


 12/06/22 20:30      94 L


 


 12/06/22 20:00  98.2 F  61  22  131/60  94 L


 


 12/06/22 16:00  98.2 F  65  14  133/83  97


 


 12/06/22 14:04      92 L


 


 12/06/22 12:00  97.8 F  68  17  115/74  95








                        Pain Assessment - Last Documented











Pain Intensity                 0











Intake and Output: 


                                 Intake & Output











 12/04/22 12/05/22 12/06/22 12/07/22





 11:59 11:59 11:59 11:59


 


Intake Total   1220 2479


 


Balance   1220 2479


 


Weight   136.8 kg 











Lab Results: 


                            Lab Results-Last 24 Hours











  12/06/22 12/06/22 12/06/22 Range/Units





  10:42 11:46 17:10 


 


POC Glucometer   90  355 H  (74 to 106)  mg/dL


 


Hemoglobin A1c  7.64 H    (4.5-6.0)  %


 


Urinalys Dipstick Clnc     


 


Urine Color     (YELLOW)  


 


Urine Appearance     (CLEAR)  


 


Urine pH     (5-6)  


 


Ur Specific Gravity     (1.005-1.025)  


 


POC Urine Protein Conf     (Negative)  


 


Urine Ketones     (NEGATIVE)  


 


Urine Nitrite     (NEGATIVE)  


 


Urine Bilirubin     (NEGATIVE)  


 


Urine Urobilinogen     (0-1)  mg/dL


 


Urine Leukocytes     (NEGATIVE)  


 


Urine WBC (Auto)     (0-5)  /HPF


 


Urine RBC (Auto)     (0-2)  /HPF


 


U Epithel Cells (Auto)     (FEW)  /HPF


 


Urine Bacteria (Auto)     (NEGATIVE)  /HPF


 


Urine RBC     (0-5)  Ha/ul


 


Ur Culture Indicated?     


 


Urine Glucose     (NEGATIVE)  mg/dL














  12/06/22 12/06/22 12/07/22 Range/Units





  18:03 21:40 06:56 


 


POC Glucometer   341 H  191 H  (74 to 106)  mg/dL


 


Hemoglobin A1c     (4.5-6.0)  %


 


Urinalys Dipstick Clnc  MAIN LAB    


 


Urine Color  YELLOW    (YELLOW)  


 


Urine Appearance  CLEAR    (CLEAR)  


 


Urine pH  5.5    (5-6)  


 


Ur Specific Gravity  1.025    (1.005-1.025)  


 


POC Urine Protein Conf  30 A    (Negative)  


 


Urine Ketones  NEGATIVE    (NEGATIVE)  


 


Urine Nitrite  NEGATIVE    (NEGATIVE)  


 


Urine Bilirubin  NEGATIVE    (NEGATIVE)  


 


Urine Urobilinogen  0.2    (0-1)  mg/dL


 


Urine Leukocytes  TRACE A    (NEGATIVE)  


 


Urine WBC (Auto)  3-5 A    (0-5)  /HPF


 


Urine RBC (Auto)  0-2    (0-2)  /HPF


 


U Epithel Cells (Auto)  NONE    (FEW)  /HPF


 


Urine Bacteria (Auto)  NONE    (NEGATIVE)  /HPF


 


Urine RBC  NEGATIVE    (0-5)  Ha/ul


 


Ur Culture Indicated?  NO    


 


Urine Glucose  100 A    (NEGATIVE)  mg/dL











Radiology Exams: 


                              Radiology Procedures











 Category Date Time Status


 


 CHEST 1 VIEW (PORTABLE) Stat Exams  12/05/22 20:06 Completed











Multi-Disciplinary Progress Notes: 


                        Multi-Disciplinary Progress Notes





12/07/22 04:57 Respiratory Note by Alysa Hughes


Patient's SpO2 was 99% on 1.5L. RT weaned O2 to room air. Patient's SpO2 dropped

to 87% on room air. O2 was increased back to 2L, SpO2 increased to 95% on 2L.





Initialized on 12/07/22 04:57 - END OF NOTE

















Assessment/Plan


(1) COVID-19


Current Visit: Yes   Status: Acute   


Assessment & Plan: 


continue remdesivir, dexamethasone and supportive care. code status SCO, covered

with xarelto


Code(s): U07.1 - COVID-19   





(2) CHF (congestive heart failure)


Current Visit: Yes   Status: Acute   


Assessment & Plan: 


lock fluids, concern for impending volume overload. continue entresto


Code(s): I50.9 - HEART FAILURE, UNSPECIFIED   





(3) Dehydration


Current Visit: Yes   Status: Acute   


Assessment & Plan: 


appears euvolemic at this time


Code(s): E86.0 - DEHYDRATION

## 2022-12-08 LAB
ANION GAP SERPL CALC-SCNC: 12.1 MEQ/L (ref 5–15)
BASOPHILS # BLD AUTO: 0.01 X10^3/UL (ref 0–0.4)
BUN SERPL-MCNC: 52 MG/DL (ref 9–20)
CALCIUM SPEC-MCNC: 7.8 MG/DL (ref 8.4–10.2)
CHLORIDE SERPL-SCNC: 105 MMOL/L (ref 98–107)
CO2 SERPL-SCNC: 23 MMOL/L (ref 22–30)
CREAT SERPL-MCNC: 1.65 MG/DL (ref 0.66–1.25)
EOSINOPHIL # BLD AUTO: 0 X10^3/UL (ref 0–0.5)
GFR SERPLBLD BASED ON 1.73 SQ M-ARVRAT: 42.9 ML/MIN
GLUCOSE SERPL-MCNC: 287 MG/DL (ref 74–106)
HCT VFR BLD AUTO: 31.6 % (ref 42–50)
HGB BLD-MCNC: 9.9 G/DL (ref 12.5–18)
LYMPHOCYTES # SPEC AUTO: 0.75 X10^3/UL (ref 1–4.6)
MCH RBC QN AUTO: 31.1 PG (ref 26–32)
MCHC RBC AUTO-ENTMCNC: 31.3 G/DL (ref 32–36)
MONOCYTES # BLD AUTO: 0.54 X10^3/UL (ref 0–1.3)
PLATELET # BLD AUTO: 183 X10^3/UL (ref 150–450)
POTASSIUM SERPLBLD-SCNC: 5.2 MMOL/L (ref 3.5–5.1)
RBC # BLD AUTO: 3.18 X10^6/UL (ref 4.1–5.6)
SODIUM SERPL-SCNC: 135 MMOL/L (ref 137–145)
WBC # BLD AUTO: 10.9 X10^3/UL (ref 4–10.5)

## 2022-12-08 RX ADMIN — METOPROLOL SUCCINATE SCH MG: 50 TABLET, EXTENDED RELEASE ORAL at 09:34

## 2022-12-08 RX ADMIN — DEXTROSE AND SODIUM CHLORIDE SCH: 5; 900 INJECTION, SOLUTION INTRAVENOUS at 20:32

## 2022-12-08 RX ADMIN — SIMVASTATIN SCH MG: 20 TABLET, FILM COATED ORAL at 09:34

## 2022-12-08 RX ADMIN — RIVAROXABAN SCH MG: 10 TABLET, FILM COATED ORAL at 09:34

## 2022-12-08 RX ADMIN — CLOPIDOGREL BISULFATE SCH MG: 75 TABLET ORAL at 09:35

## 2022-12-08 RX ADMIN — INSULIN LISPRO PRN UNIT: 100 INJECTION, SOLUTION INTRAVENOUS; SUBCUTANEOUS at 09:35

## 2022-12-08 RX ADMIN — INSULIN LISPRO PRN UNIT: 100 INJECTION, SOLUTION INTRAVENOUS; SUBCUTANEOUS at 21:33

## 2022-12-08 RX ADMIN — MAGNESIUM OXIDE TAB 400 MG (241.3 MG ELEMENTAL MG) SCH MG: 400 (241.3 MG) TAB at 09:34

## 2022-12-08 RX ADMIN — DEXAMETHASONE SODIUM PHOSPHATE SCH MG: 10 INJECTION INTRAMUSCULAR; INTRAVENOUS at 09:35

## 2022-12-08 RX ADMIN — CEFEPIME HYDROCHLORIDE SCH MLS/HR: 2 INJECTION, POWDER, FOR SOLUTION INTRAVENOUS at 21:21

## 2022-12-08 RX ADMIN — INSULIN LISPRO PRN UNIT: 100 INJECTION, SOLUTION INTRAVENOUS; SUBCUTANEOUS at 17:10

## 2022-12-08 RX ADMIN — SACUBITRIL AND VALSARTAN SCH TABLET: 49; 51 TABLET, FILM COATED ORAL at 21:21

## 2022-12-08 RX ADMIN — DOCUSATE SODIUM SCH MG: 100 CAPSULE, LIQUID FILLED ORAL at 09:34

## 2022-12-08 RX ADMIN — SACUBITRIL AND VALSARTAN SCH TABLET: 49; 51 TABLET, FILM COATED ORAL at 09:34

## 2022-12-08 NOTE — PCM.NOTE
Date and Time: 12/08/22  0900





Subjective Assessment: 





cough seems some better. he is discouraged, wants to shave and get a shower. no 

other new complaints





Objective Exam


General Appearance: no apparent distress


Neurologic Exam: alert, oriented x 3


Respiratory Exam: diminished breath sounds, rhonchi, No respiratory distress


Cardiovascular Exam: regular rate/rhythm, normal heart sounds


Gastrointestinal/Abdomen Exam: soft, No tenderness, No mass


Extremity Exam: normal inspection, normal range of motion





OBJECTIVE DATA


Vital Signs: 


                               Vital Signs - 24 hr











  Temp Pulse Resp BP Pulse Ox


 


 12/08/22 08:00  98.4 F  62  19  142/62  94 L


 


 12/08/22 06:13      94 L


 


 12/08/22 06:00    26 H  


 


 12/08/22 04:00  98.1 F  61  26 H  99/50  91 L


 


 12/08/22 02:00    16  


 


 12/08/22 00:00    16  


 


 12/07/22 23:56  97.6 F  60  16  135/63  95


 


 12/07/22 23:20      91 L


 


 12/07/22 22:00    16  


 


 12/07/22 20:00    20  


 


 12/07/22 19:42  98.0 F  65  20  145/63  91 L


 


 12/07/22 17:55    17  


 


 12/07/22 16:00    17  


 


 12/07/22 14:00    17  


 


 12/07/22 12:00  97.5 F  86  17  123/59  92 L


 


 12/07/22 10:00    17  








                        Pain Assessment - Last Documented











Pain Intensity                 0











Intake and Output: 


                                 Intake & Output











 12/05/22 12/06/22 12/07/22 12/08/22





 11:59 11:59 11:59 11:59


 


Intake Total  1220 2959 2160


 


Balance  1220 2959 2160


 


Weight  136.8 kg  











Lab Results: 


                            Lab Results-Last 24 Hours











  12/07/22 12/07/22 12/07/22 Range/Units





  11:52 16:40 21:56 


 


WBC     (4.0-10.5)  x10^3/uL


 


RBC     (4.1-5.6)  x10^6/uL


 


Hgb     (12.5-18.0)  g/dL


 


Hct     (42-50)  %


 


MCV     ()  fL


 


MCH     (26-32)  pg


 


MCHC     (32-36)  g/dL


 


RDW     (11.5-14.0)  %


 


Plt Count     (150-450)  x10^3/uL


 


MPV     (7.5-11.0)  fL


 


Gran %     (36.0-66.0)  %


 


Immature Gran % (Auto)     (0.00-0.4)  %


 


Nucleat RBC Rel Count     (0.00-0.1)  %


 


Eos # (Auto)     (0-0.5)  x10^3/uL


 


Immature Gran # (Auto)     (0.00-0.03)  x10^3u/L


 


Absolute Lymphs (auto)     (1.0-4.6)  x10^3/uL


 


Absolute Monos (auto)     (0.0-1.3)  x10^3/uL


 


Absolute Nucleated RBC     (0.00-0.01)  x10^3u/L


 


Lymphocytes %     (24.0-44.0)  %


 


Monocytes %     (0.0-12.0)  %


 


Eosinophils %     (0.00-5.0)  %


 


Basophils %     (0.0-0.4)  %


 


Absolute Granulocytes     (1.4-6.9)  x10^3/uL


 


Basophils #     (0-0.4)  x10^3/uL


 


Sodium     (137-145)  mmol/L


 


Potassium     (3.5-5.1)  mmol/L


 


Chloride     ()  mmol/L


 


Carbon Dioxide     (22-30)  mmol/L


 


Anion Gap     (5-15)  MEQ/L


 


BUN     (9-20)  mg/dL


 


Creatinine     (0.66-1.25)  mg/dL


 


Estimated GFR     ML/MIN


 


Glucose     ()  mg/dL


 


POC Glucometer  208 H  307 H  293 H  (74 to 106)  mg/dL


 


Calcium     (8.4-10.2)  mg/dL














  12/08/22 12/08/22 12/08/22 Range/Units





  05:35 05:35 07:11 


 


WBC  10.9 H    (4.0-10.5)  x10^3/uL


 


RBC  3.18 L    (4.1-5.6)  x10^6/uL


 


Hgb  9.9 L    (12.5-18.0)  g/dL


 


Hct  31.6 L    (42-50)  %


 


MCV  99.4    ()  fL


 


MCH  31.1    (26-32)  pg


 


MCHC  31.3 L    (32-36)  g/dL


 


RDW  13.2    (11.5-14.0)  %


 


Plt Count  183    (150-450)  x10^3/uL


 


MPV  10.1    (7.5-11.0)  fL


 


Gran %  87.4 H    (36.0-66.0)  %


 


Immature Gran % (Auto)  0.7 H    (0.00-0.4)  %


 


Nucleat RBC Rel Count  0.0    (0.00-0.1)  %


 


Eos # (Auto)  0    (0-0.5)  x10^3/uL


 


Immature Gran # (Auto)  0.08 H    (0.00-0.03)  x10^3u/L


 


Absolute Lymphs (auto)  0.75 L    (1.0-4.6)  x10^3/uL


 


Absolute Monos (auto)  0.54    (0.0-1.3)  x10^3/uL


 


Absolute Nucleated RBC  0.00    (0.00-0.01)  x10^3u/L


 


Lymphocytes %  6.9 L    (24.0-44.0)  %


 


Monocytes %  4.9    (0.0-12.0)  %


 


Eosinophils %  0.0    (0.00-5.0)  %


 


Basophils %  0.1    (0.0-0.4)  %


 


Absolute Granulocytes  9.55 H    (1.4-6.9)  x10^3/uL


 


Basophils #  0.01    (0-0.4)  x10^3/uL


 


Sodium   135 L   (137-145)  mmol/L


 


Potassium   5.2 H   (3.5-5.1)  mmol/L


 


Chloride   105   ()  mmol/L


 


Carbon Dioxide   23   (22-30)  mmol/L


 


Anion Gap   12.1   (5-15)  MEQ/L


 


BUN   52 H   (9-20)  mg/dL


 


Creatinine   1.65 H   (0.66-1.25)  mg/dL


 


Estimated GFR   42.9   ML/MIN


 


Glucose   287 H   ()  mg/dL


 


POC Glucometer    251 H  (74 to 106)  mg/dL


 


Calcium   7.8 L   (8.4-10.2)  mg/dL











Multi-Disciplinary Progress Notes: 


                        Multi-Disciplinary Progress Notes





12/07/22 13:06 Physical Therapy Note by Tracy(YULISA#[id number]),Sheela


PT. WAS SEEN BY PRED THIS A.M.  PT. IN BED UPON P.T. ARRIVAL TO ROOM.  NO C/O 

PN.  PT. REPORTS HE DID FALL LAST NIGHT ONTO HIS BUTTOCKS ATTEMPTING TO GET TO 

THE BATHROOM.  REPORTS HE GOT TANGLED UP IN O2 LINE.  PT. REFUSED USING WALKER 

FOR STABILITY.  LIVES ALONE.  PT. DENIED NEED FOR INPT. REHAB OR HHC UPON D/C.  

O2 SATS 96% AT REST ON 2 L O2.  PERFORMED SUPINE TO SIT W/ SBA.  NO C/O 

DIZZINESS W/ POSITION CHANGE.  SIT TO STAND - SBA.  AMBULATED ~ 60' IN ROOM 

WITHOUT A.D. O2 SATS 95% W/ GAIT ON 2L.  PT. ABLE TO FINISH WALK ON RA AND SIT 

IN CHAIR AFTERWARD W/ SATS 93% ON RA.  








PT. NOT PARTICULARLY MOTIVATED TO PARTICIPATE IN THERAPY.  WILL CONT. TO  WORK 

ON GAIT STABILITY AND PROPER BREATHING DURING STAY; HOWEVER, PT. SHOULD MOVE F

REQUENTLY W/ NSG STAFF AS WELL AS IT IS UNLIKELY THAT HE WILL WANT ADDITIONAL 

SERVICES AFTER D/C.       





   ** Electronically signed by Tracy(L#[id number])Sheela PT on 12/07/22 13:13 **


Initialized on 12/07/22 13:06 - END OF NOTE








12/07/22 13:00 Case Management Note by Oralia Simms


S/W PATIENT AGAIN VIA PHONE- HE CONTINUES TO REFUSE REHAB OR HHC. HE STATED " 

I'M TIRED OF BEING BOTHERED WITH THIS SHIT"


S/W ARACELY MCMANUS ([phone]) PER HER REQUEST AND HIS PERMISSION- SHE WOULD 

IDEALLY LIKE PATIENT TO GO TO REHAB OR AT THE VERY LEAST TAKE HHC. HOWEVER SHE 

IS AWARE PATIENT HAS BEEN ADAMANT AGAINST THIS. SHE REPORTS SHE IS UNABLE TO 

STAY WITH PATIENT AT TIME OF DC. BUT CAN CHECK ON HIM DAILY. SHE WILL TRY TO 

PERSUADE PATIENT INTO EITHER REHAB FOR HHC AND WILL UPDATE CASE MANAGEMENT IF 

SHE IS SUCCESSFUL





Initialized on 12/07/22 13:00 - END OF NOTE

















Assessment/Plan


(1) COVID-19


Current Visit: Yes   Status: Acute   


Assessment & Plan: 


continue remdesivir and dexamethasone


Code(s): U07.1 - COVID-19   





(2) CHF (congestive heart failure)


Current Visit: Yes   Status: Acute   Code(s): I50.9 - HEART FAILURE, UNSPECIFIED

  





(3) Dehydration


Current Visit: Yes   Status: Acute   Code(s): E86.0 - DEHYDRATION

## 2022-12-09 VITALS — OXYGEN SATURATION: 92 % | SYSTOLIC BLOOD PRESSURE: 132 MMHG | DIASTOLIC BLOOD PRESSURE: 56 MMHG

## 2022-12-09 VITALS — HEART RATE: 70 BPM

## 2022-12-09 LAB
ANION GAP SERPL CALC-SCNC: 11.2 MEQ/L (ref 5–15)
BASOPHILS # BLD AUTO: 0.01 X10^3/UL (ref 0–0.4)
BUN SERPL-MCNC: 57 MG/DL (ref 9–20)
CALCIUM SPEC-MCNC: 7.8 MG/DL (ref 8.4–10.2)
CHLORIDE SERPL-SCNC: 104 MMOL/L (ref 98–107)
CO2 SERPL-SCNC: 25 MMOL/L (ref 22–30)
CREAT SERPL-MCNC: 1.53 MG/DL (ref 0.66–1.25)
EOSINOPHIL # BLD AUTO: 0 X10^3/UL (ref 0–0.5)
GFR SERPLBLD BASED ON 1.73 SQ M-ARVRAT: 46.8 ML/MIN
GLUCOSE SERPL-MCNC: 326 MG/DL (ref 74–106)
HCT VFR BLD AUTO: 30.4 % (ref 42–50)
HGB BLD-MCNC: 9.6 G/DL (ref 12.5–18)
LYMPHOCYTES # SPEC AUTO: 0.9 X10^3/UL (ref 1–4.6)
MAGNESIUM SERPL-MCNC: 2.2 MG/DL (ref 1.6–2.3)
MCH RBC QN AUTO: 31.1 PG (ref 26–32)
MCHC RBC AUTO-ENTMCNC: 31.6 G/DL (ref 32–36)
MONOCYTES # BLD AUTO: 0.56 X10^3/UL (ref 0–1.3)
PLATELET # BLD AUTO: 192 X10^3/UL (ref 150–450)
POTASSIUM SERPLBLD-SCNC: 5.4 MMOL/L (ref 3.5–5.1)
RBC # BLD AUTO: 3.09 X10^6/UL (ref 4.1–5.6)
SODIUM SERPL-SCNC: 135 MMOL/L (ref 137–145)
WBC # BLD AUTO: 9.1 X10^3/UL (ref 4–10.5)

## 2022-12-09 RX ADMIN — MAGNESIUM OXIDE TAB 400 MG (241.3 MG ELEMENTAL MG) SCH MG: 400 (241.3 MG) TAB at 09:28

## 2022-12-09 RX ADMIN — DOCUSATE SODIUM SCH MG: 100 CAPSULE, LIQUID FILLED ORAL at 09:28

## 2022-12-09 RX ADMIN — METOPROLOL SUCCINATE SCH MG: 50 TABLET, EXTENDED RELEASE ORAL at 09:29

## 2022-12-09 RX ADMIN — RIVAROXABAN SCH MG: 10 TABLET, FILM COATED ORAL at 09:31

## 2022-12-09 RX ADMIN — SIMVASTATIN SCH MG: 20 TABLET, FILM COATED ORAL at 09:28

## 2022-12-09 RX ADMIN — SACUBITRIL AND VALSARTAN SCH TABLET: 49; 51 TABLET, FILM COATED ORAL at 09:28

## 2022-12-09 RX ADMIN — CLOPIDOGREL BISULFATE SCH MG: 75 TABLET ORAL at 09:28

## 2022-12-09 RX ADMIN — DEXAMETHASONE SODIUM PHOSPHATE SCH MG: 10 INJECTION INTRAMUSCULAR; INTRAVENOUS at 09:29

## 2022-12-09 RX ADMIN — CEFEPIME HYDROCHLORIDE SCH MLS/HR: 2 INJECTION, POWDER, FOR SOLUTION INTRAVENOUS at 12:21

## 2022-12-09 RX ADMIN — INSULIN LISPRO PRN UNIT: 100 INJECTION, SOLUTION INTRAVENOUS; SUBCUTANEOUS at 09:32

## 2022-12-09 NOTE — PCM.DS
Discharge Summary


Date of Admission: 


12/06/22 07:45





Admitting Physician: 


MANISHA AFLCON DO





Primary Care Provider: 


TABBY RIVAS YUDITH








Allergies


Allergies





No Known Drug Allergies Allergy (Verified 12/05/22 18:10)


   











Hospital Summary





- Hospital Course


Hospital Course: 





patient admitted with cough and shortness of breath, covid +, completed course 

of remdesivir on date of discharge. his breathing is much better and he is 

requiring minimal supplemental oxygen at time of discharge





- Vitals & Intake/Output


Vital Signs: 





                                   Vital Signs











Temperature  97.3 F   12/09/22 07:15


 


Pulse Rate  70   12/09/22 07:15


 


Respiratory Rate  19   12/09/22 07:15


 


Blood Pressure  149/78   12/09/22 07:15


 


O2 Sat by Pulse Oximetry  95   12/09/22 07:15











Intake & Output: 





                                 Intake & Output











 12/06/22 12/07/22 12/08/22 12/09/22





 11:59 11:59 11:59 11:59


 


Intake Total 1220 2959 2160 1680


 


Balance 1220 2959 2160 1680


 


Weight 136.8 kg   136.8 kg














- Lab


Result Diagrams: 


                                 12/09/22 05:10





                                 12/09/22 05:10


Lab Results-Last 24 Hrs: 





                            Lab Results-Last 24 Hours











  12/08/22 12/08/22 12/08/22 Range/Units





  11:22 15:23 21:13 


 


WBC     (4.0-10.5)  x10^3/uL


 


RBC     (4.1-5.6)  x10^6/uL


 


Hgb     (12.5-18.0)  g/dL


 


Hct     (42-50)  %


 


MCV     ()  fL


 


MCH     (26-32)  pg


 


MCHC     (32-36)  g/dL


 


RDW     (11.5-14.0)  %


 


Plt Count     (150-450)  x10^3/uL


 


MPV     (7.5-11.0)  fL


 


Gran %     (36.0-66.0)  %


 


Immature Gran % (Auto)     (0.00-0.4)  %


 


Nucleat RBC Rel Count     (0.00-0.1)  %


 


Eos # (Auto)     (0-0.5)  x10^3/uL


 


Immature Gran # (Auto)     (0.00-0.03)  x10^3u/L


 


Absolute Lymphs (auto)     (1.0-4.6)  x10^3/uL


 


Absolute Monos (auto)     (0.0-1.3)  x10^3/uL


 


Absolute Nucleated RBC     (0.00-0.01)  x10^3u/L


 


Lymphocytes %     (24.0-44.0)  %


 


Monocytes %     (0.0-12.0)  %


 


Eosinophils %     (0.00-5.0)  %


 


Basophils %     (0.0-0.4)  %


 


Absolute Granulocytes     (1.4-6.9)  x10^3/uL


 


Basophils #     (0-0.4)  x10^3/uL


 


Sodium     (137-145)  mmol/L


 


Potassium     (3.5-5.1)  mmol/L


 


Chloride     ()  mmol/L


 


Carbon Dioxide     (22-30)  mmol/L


 


Anion Gap     (5-15)  MEQ/L


 


BUN     (9-20)  mg/dL


 


Creatinine     (0.66-1.25)  mg/dL


 


Estimated GFR     ML/MIN


 


Glucose     ()  mg/dL


 


POC Glucometer  200 H  TNP  297 H  (74 to 106)  mg/dL


 


Calcium     (8.4-10.2)  mg/dL


 


Magnesium     (1.6-2.3)  mg/dL














  12/09/22 12/09/22 12/09/22 Range/Units





  05:10 05:10 06:42 


 


WBC  9.1    (4.0-10.5)  x10^3/uL


 


RBC  3.09 L    (4.1-5.6)  x10^6/uL


 


Hgb  9.6 L    (12.5-18.0)  g/dL


 


Hct  30.4 L    (42-50)  %


 


MCV  98.4    ()  fL


 


MCH  31.1    (26-32)  pg


 


MCHC  31.6 L    (32-36)  g/dL


 


RDW  13.1    (11.5-14.0)  %


 


Plt Count  192    (150-450)  x10^3/uL


 


MPV  10.6    (7.5-11.0)  fL


 


Gran %  82.7 H    (36.0-66.0)  %


 


Immature Gran % (Auto)  1.2 H    (0.00-0.4)  %


 


Nucleat RBC Rel Count  0.0    (0.00-0.1)  %


 


Eos # (Auto)  0    (0-0.5)  x10^3/uL


 


Immature Gran # (Auto)  0.11 H    (0.00-0.03)  x10^3u/L


 


Absolute Lymphs (auto)  0.90 L    (1.0-4.6)  x10^3/uL


 


Absolute Monos (auto)  0.56    (0.0-1.3)  x10^3/uL


 


Absolute Nucleated RBC  0.00    (0.00-0.01)  x10^3u/L


 


Lymphocytes %  9.9 L    (24.0-44.0)  %


 


Monocytes %  6.1    (0.0-12.0)  %


 


Eosinophils %  0.0    (0.00-5.0)  %


 


Basophils %  0.1    (0.0-0.4)  %


 


Absolute Granulocytes  7.55 H    (1.4-6.9)  x10^3/uL


 


Basophils #  0.01    (0-0.4)  x10^3/uL


 


Sodium   135 L   (137-145)  mmol/L


 


Potassium   5.4 H   (3.5-5.1)  mmol/L


 


Chloride   104   ()  mmol/L


 


Carbon Dioxide   25   (22-30)  mmol/L


 


Anion Gap   11.2   (5-15)  MEQ/L


 


BUN   57 H   (9-20)  mg/dL


 


Creatinine   1.53 H   (0.66-1.25)  mg/dL


 


Estimated GFR   46.8   ML/MIN


 


Glucose   326 H   ()  mg/dL


 


POC Glucometer    311 H  (74 to 106)  mg/dL


 


Calcium   7.8 L   (8.4-10.2)  mg/dL


 


Magnesium   2.2   (1.6-2.3)  mg/dL











Micro Results-Entire Visit: 





                                   Accuchecks











Date                           12/09/22


 


Date                           12/08/22


 


Date                           12/08/22


 


Time                           07:13


 


Time                           16:00


 


Time                           11:33

















- Procedures and Test


Procedures and Tests throughout Hospitalization: 





                            Therapy Orders & Screens





12/06/22 00:58


Respiratory Therapy Consult ROUTINE 


   Comment: 


   Reason For Exam: 





12/06/22 02:34


Oxygen Nasal Cannula 2 lpm 


   Comment: 


   Diagnosis: COVID 19





12/06/22 11:28


PT Eval & Treat (MD Order) ONCE 


   Reason for Eval:: weakness


   Diagnosis: COVID 19





12/08/22 12:32


RT Miscellaneous Order ROUTINE 


   Comment: 


   Physician Instructions: WEAN O2 AS JERZY


   Reason For Exam: 


   Diagnosis: COVID 19, WEAKNESS, HYPOGLYCEMIA














Discharge Exam


General Appearance: obese


Neurologic Exam: alert, oriented x 3


Respiratory Exam: normal breath sounds, lungs clear, No respiratory distress


Cardiovascular Exam: regular rate/rhythm, normal heart sounds


Gastrointestinal/Abdomen Exam: soft, No tenderness, No mass


Extremity Exam: pedal edema, swelling





Final Diagnosis/Problem List





- Final Discharge Diagnosis/Problem


(1) COVID-19


Current Visit: Yes   Status: Acute   


Assessment & Plan: 


completed course of remdesivir, home on dexamethasone, continue xarelto. 

improving clinically


Code(s): U07.1 - COVID-19   





(2) CHF (congestive heart failure)


Current Visit: Yes   Status: Acute   


Assessment & Plan: 


stable, euvolemic on discharge


Code(s): I50.9 - HEART FAILURE, UNSPECIFIED   





(3) Dehydration


Current Visit: Yes   Status: Acute   Code(s): E86.0 - DEHYDRATION   





- Discharge


Disposition: Home, Self-Care


Condition: Stable


Prescriptions: 


New


   dexAMETHasone [Dexamethasone] 6 mg PO DAILY #7 tablet





Continue


   Magnesium Oxide 400 mg*** [Mag-Ox 400***] 400 mg PO DAILY


   Docusate Sodium 100 mg*** [Docusate Sodium 100 MG***] 100 mg PO DAILY


   Pravastatin Sodium [Pravachol] 40 mg PO HS


   Clopidogrel Bisulfate [PLAVIX Tablet] 75 mg PO DAILY


   Metoprolol Succinate 50 mg*** [Toprol Xl 50 MG***] 50 mg PO DAILY


   Rivaroxaban [Xarelto] 15 mg PO DAILY


   Sacubitril/Valsartan [Entresto 49 mg-51 mg Tablet] 1 each PO BID


   Insulin Glargine,Hum.rec.anlog [Lantus] 100 unit SQ LUNCH


   Insulin Lispro [Humalog] 100 unit SQ DAILY PRN


     PRN Reason: Hyperglycemia


Follow up with: 


TABBY RIVAS MD [Primary Care Provider] -